# Patient Record
Sex: MALE | Race: WHITE | NOT HISPANIC OR LATINO | Employment: FULL TIME | URBAN - METROPOLITAN AREA
[De-identification: names, ages, dates, MRNs, and addresses within clinical notes are randomized per-mention and may not be internally consistent; named-entity substitution may affect disease eponyms.]

---

## 2019-01-29 ENCOUNTER — OFFICE VISIT (OUTPATIENT)
Dept: RETAIL CLINIC | Facility: CLINIC | Age: 29
End: 2019-01-29

## 2019-01-29 VITALS
OXYGEN SATURATION: 99 % | SYSTOLIC BLOOD PRESSURE: 110 MMHG | DIASTOLIC BLOOD PRESSURE: 64 MMHG | RESPIRATION RATE: 18 BRPM | TEMPERATURE: 98.7 F | HEART RATE: 74 BPM

## 2019-01-29 DIAGNOSIS — H00.011 HORDEOLUM EXTERNUM OF RIGHT UPPER EYELID: Primary | ICD-10-CM

## 2019-01-29 DIAGNOSIS — H01.001 BLEPHARITIS OF RIGHT UPPER EYELID, UNSPECIFIED TYPE: ICD-10-CM

## 2019-01-29 PROCEDURE — 99203 OFFICE O/P NEW LOW 30 MIN: CPT | Performed by: NURSE PRACTITIONER

## 2019-01-29 RX ORDER — NEOMYCIN SULFATE, POLYMYXIN B SULFATE, AND DEXAMETHASONE 3.5; 10000; 1 MG/G; [USP'U]/G; MG/G
OINTMENT OPHTHALMIC EVERY 6 HOURS SCHEDULED
Qty: 3.5 G | Refills: 0 | Status: SHIPPED | OUTPATIENT
Start: 2019-01-29 | End: 2019-02-03

## 2019-01-29 NOTE — PROGRESS NOTES
Subjective   Donta Gibson is a 28 y.o. male.     Eye Problem    The right eye is affected. This is a new problem. Episode onset: 2 days. The problem occurs constantly. The problem has been gradually worsening. There was no injury mechanism. The pain is mild. There is no known exposure to pink eye. Associated symptoms include eye redness (eyelid). Pertinent negatives include no blurred vision. Treatments tried: hot compress. The treatment provided no relief.        The following portions of the patient's history were reviewed and updated as appropriate: allergies, current medications, past family history, past medical history, past social history, past surgical history and problem list.    Review of Systems   HENT: Negative.    Eyes: Positive for redness (eyelid). Negative for blurred vision.   Respiratory: Negative.    Cardiovascular: Negative.    Gastrointestinal: Negative.    Musculoskeletal: Negative.    Neurological: Negative.        Objective   Physical Exam   Constitutional: He is cooperative. No distress.   HENT:   Head: Normocephalic.   Right Ear: Hearing, tympanic membrane, external ear and ear canal normal.   Left Ear: Hearing, tympanic membrane, external ear and ear canal normal.   Nose: Nose normal.   Mouth/Throat: Oropharynx is clear and moist.   Eyes: Conjunctivae, EOM and lids are normal. Pupils are equal, round, and reactive to light.   Right eyelid with moderate amount of swelling not obstructing vision and redness on the lid, tender to touch. No vision disturbance   Neck: Trachea normal and full passive range of motion without pain.   Cardiovascular: Normal rate, regular rhythm and normal pulses.   Pulmonary/Chest: Effort normal and breath sounds normal.   Neurological: He is alert.   Skin: Skin is warm. Capillary refill takes less than 2 seconds.   Psychiatric: He has a normal mood and affect. His speech is normal and behavior is normal.   Vitals reviewed.        Assessment/Plan   Donta was seen  today for stye.    Diagnoses and all orders for this visit:    Hordeolum externum of right upper eyelid    Blepharitis of right upper eyelid, unspecified type    Other orders  -     neomycin-polymyxin-dexamethamethasone (POLYDEX) 3.5-33682-8.1 ointment ophthalmic ointment; Administer  to the right eye Every 6 (Six) Hours for 5 days.      Continue to use warm compress every 4-6 hours and ibuprofen for pain

## 2019-01-29 NOTE — PATIENT INSTRUCTIONS
Blepharitis  Blepharitis is inflammation of the eyelids. Blepharitis may happen with:  · Reddish, scaly skin around the scalp and eyebrows.  · Burning or itching of the eyelids.  · Eye discharge at night that causes the eyelashes to stick together in the morning.  · Eyelashes that fall out.  · Sensitivity to light.    Follow these instructions at home:  Pay attention to any changes in how you look or feel. Follow these instructions to help with your condition:  Keeping Clean  · Wash your hands often.  · Wash your eyelids with warm water or with warm water that is mixed with a small amount of baby shampoo. Do this two times per day or as often as needed.  · Wash your face and eyebrows at least once a day.  · Use a clean towel each time you dry your eyelids. Do not use this towel to clean or dry other areas of your body. Do not share your towel with anyone.  General instructions  · Avoid wearing makeup until you get better. Do not share makeup with anyone.  · Avoid rubbing your eyes.  · Apply warm compresses to your eyes 2 times per day for 10 minutes at a time, or as told by your health care provider.  · If you were prescribed an antibiotic ointment or steroid drops, apply or use the medicine as told by your health care provider. Do not stop using the medicine even if you feel better.  · Keep all follow-up visits as told by your health care provider. This is important.  Contact a health care provider if:  · Your eyelids feel hot.  · You have blisters or a rash on your eyelids.  · The condition does not go away in 2-4 days.  · The inflammation gets worse.  Get help right away if:  · You have pain or redness that gets worse or spreads to other parts of your face.  · Your vision changes.  · You have pain when looking at lights or moving objects.  · You have a fever.  This information is not intended to replace advice given to you by your health care provider. Make sure you discuss any questions you have with your health  care provider.  Document Released: 12/15/2001 Document Revised: 05/25/2017 Document Reviewed: 04/11/2016  [employer] Interactive Patient Education © 2018 [employer]  Stye  A stye is a bump on your eyelid caused by a bacterial infection. A stye can form inside the eyelid (internal stye) or outside the eyelid (external stye). An internal stye may be caused by an infected oil-producing gland inside your eyelid. An external stye may be caused by an infection at the base of your eyelash (hair follicle).  Styes are very common. Anyone can get them at any age. They usually occur in just one eye, but you may have more than one in either eye.  What are the causes?  The infection is almost always caused by bacteria called Staphylococcus aureus. This is a common type of bacteria that lives on your skin.  What increases the risk?  You may be at higher risk for a stye if you have had one before. You may also be at higher risk if you have:  · Diabetes.  · Long-term illness.  · Long-term eye redness.  · A skin condition called seborrhea.  · High fat levels in your blood (lipids).    What are the signs or symptoms?  Eyelid pain is the most common symptom of a stye. Internal styes are more painful than external styes. Other signs and symptoms may include:  · Painful swelling of your eyelid.  · A scratchy feeling in your eye.  · Tearing and redness of your eye.  · Pus draining from the stye.    How is this diagnosed?  Your health care provider may be able to diagnose a stye just by examining your eye. The health care provider may also check to make sure:  · You do not have a fever or other signs of a more serious infection.  · The infection has not spread to other parts of your eye or areas around your eye.    How is this treated?  Most styes will clear up in a few days without treatment. In some cases, you may need to use antibiotic drops or ointment to prevent infection. Your health care provider may have to drain the stye  surgically if your stye is:  · Large.  · Causing a lot of pain.  · Interfering with your vision.    This can be done using a thin blade or a needle.  Follow these instructions at home:  · Take medicines only as directed by your health care provider.  · Apply a clean, warm compress to your eye for 10 minutes, 4 times a day.  · Do not wear contact lenses or eye makeup until your stye has healed.  · Do not try to pop or drain the stye.  Contact a health care provider if:  · You have chills or a fever.  · Your stye does not go away after several days.  · Your stye affects your vision.  · Your eyeball becomes swollen, red, or painful.  This information is not intended to replace advice given to you by your health care provider. Make sure you discuss any questions you have with your health care provider.  Document Released: 09/27/2006 Document Revised: 08/13/2017 Document Reviewed: 04/03/2015  [employer] Interactive Patient Education © 2018 [employer]

## 2020-05-28 ENCOUNTER — TRANSCRIBE ORDERS (OUTPATIENT)
Dept: ADMINISTRATIVE | Facility: HOSPITAL | Age: 30
End: 2020-05-28

## 2020-05-28 DIAGNOSIS — N62 GYNECOMASTIA: Primary | ICD-10-CM

## 2020-05-28 DIAGNOSIS — N64.4 PAIN OF LEFT BREAST: ICD-10-CM

## 2020-06-03 ENCOUNTER — HOSPITAL ENCOUNTER (OUTPATIENT)
Dept: GENERAL RADIOLOGY | Facility: HOSPITAL | Age: 30
Discharge: HOME | End: 2020-06-03

## 2020-06-03 ENCOUNTER — HOSPITAL ENCOUNTER (OUTPATIENT)
Dept: MAMMOGRAPHY | Facility: HOSPITAL | Age: 30
Discharge: HOME | End: 2020-06-03
Admitting: INTERNAL MEDICINE

## 2020-06-03 DIAGNOSIS — M54.50 LOW BACK PAIN, UNSPECIFIED BACK PAIN LATERALITY, UNSPECIFIED CHRONICITY, UNSPECIFIED WHETHER SCIATICA PRESENT: ICD-10-CM

## 2020-06-03 DIAGNOSIS — N64.4 PAIN OF LEFT BREAST: ICD-10-CM

## 2020-06-03 DIAGNOSIS — N62 GYNECOMASTIA: ICD-10-CM

## 2020-06-03 PROCEDURE — 77066 DX MAMMO INCL CAD BI: CPT

## 2020-06-03 PROCEDURE — 72110 X-RAY EXAM L-2 SPINE 4/>VWS: CPT

## 2020-06-19 ENCOUNTER — OFFICE VISIT (OUTPATIENT)
Dept: MAMMOGRAPHY | Facility: CLINIC | Age: 30
End: 2020-06-19

## 2020-06-19 VITALS
DIASTOLIC BLOOD PRESSURE: 70 MMHG | HEIGHT: 70 IN | WEIGHT: 178 LBS | SYSTOLIC BLOOD PRESSURE: 122 MMHG | BODY MASS INDEX: 25.48 KG/M2

## 2020-06-19 DIAGNOSIS — N62 GYNECOMASTIA, MALE: Primary | ICD-10-CM

## 2020-06-19 PROCEDURE — 99204 OFFICE O/P NEW MOD 45 MIN: CPT | Performed by: SURGERY

## 2020-06-19 RX ORDER — BUPROPION HYDROCHLORIDE 150 MG/1
TABLET ORAL
COMMUNITY
Start: 2020-05-07 | End: 2021-01-25

## 2020-06-19 NOTE — PROGRESS NOTES
Chief Complaint: Donta Gibson is a 29 y.o.. female here today for Breast Mass        History of Present Illness:  Patient presents with breast mass.gynecomastia in both breast.  He is a 29-year-old white male who complains of a subareolar left breast mass and some pain.  The pain actually began years ago and is gotten some better but now when the area is palpated or pushed on it gets very tender.  He can feel a rubbery mass beneath the nipple areolar complex that he thinks measures about 2 inches in size.  There is been no nipple discharge.  He did undergo a mammogram which revealed severe gynecomastia on the left with some mild gynecomastia on the right.    The patient does lift weights but does not take steroids.  He does admit to smoking quite a bit of marijuana.    I have personally reviewed the imaging studies and the reports accompanying them.  I agree that the process seems significantly worse on the left side.    Review of Systems:  Review of Systems   Skin:        The patient denies any noticeable changes to the skin of the breast.    All other systems reviewed and are negative.     I have reviewed the ROS as documented by the MA/LPN/RN Flip Rivzi MD      Past Medical and Surgical History:  Breast Biopsy History:  Patient has not had a breast biopsy in the past.  Breast Cancer HIstory:  Patient does not have a past medical history of breast cancer.  Breast Operations, and year:  None  Social History     Tobacco Use   Smoking Status Never Smoker         History reviewed. No pertinent surgical history.    History reviewed. No pertinent past medical history.    Prior Hospitalizations, other than for surgery or childbirth, and year:  None    Social History:  Patient is single.  Patient has no children.    Family History:  History reviewed. No pertinent family history.    Vital Signs:  Vitals:    06/19/20 1358   BP: 122/70       Medications:    Current Outpatient Prescriptions:     Current Outpatient  Medications:   •  buPROPion XL (WELLBUTRIN XL) 150 MG 24 hr tablet, , Disp: , Rfl:     Physical Examination:  General Appearance:   Patient is in no distress.  She is well kept and has an average build.   Psychiatric:  Patient with appropriate mood and affect. Alert and oriented to self, time, and place.    Breast, RIGHT:  small sized, symmetric with the contralateral side.  Breast skin is without erythema, edema, rashes.  He has very well-developed pectoralis muscles.  There are no visible abnormalities upon inspection during the arm-raising maneuver or with hands on hips in the sitting position. There is no nipple retraction, discharge or nipple/areolar skin changes.There are no masses palpable in the sitting or supine positions.    Breast, LEFT:  small sized, symmetric with the contralateral side.  Breast skin is without erythema, edema, rashes.   He has very well-developed pectoralis muscles.  There are no visible abnormalities upon inspection during the arm-raising maneuver or with hands on hips in the sitting position. There is no nipple retraction, discharge or nipple/areolar skin changes.there is a rubbery mass beneath the nipple areolar complex measuring approximately 2.5 x 2 cm.    Lymphatic:  There is no axillary, cervical, infraclavicular, or supraclavicular adenopathy bilaterally.  Eyes:  Pupils are round and reactive to light.  Cardiovascular:  Heart rate and rhythm are regular.  Respiratory:  Lungs are clear bilaterally with no crackles or wheezes in any lung field.  Gastrointestinal:  Abdomen is soft, nondistended, and nontender.  There was no obvious hepatosplenomegaly or abdominal mass.  There are no scars from previous surgery.        Assessment:  1. Gynecomastia, male          Plan:  We discussed the various etiologies of gynecomastia.  I suspect this process has been going on for quite some time.  We will order some blood work to rule out chronic disease and endocrine abnormalities.  If  everything looks normal we will likely proceed with surgery.  I have discussed what is involved with surgery.  The patient understands there could be some flattening to the nipple areolar complex on the left side after surgery and he said that that would not be a problem for him.      CPT coding:    Next Appointment:  No follow-ups on file.            EMR Dragon/transcription disclaimer:    Much of this encounter note is an electronic transcription/translocation of spoken language to printed text.  The electronic translation of spoken language may permit erroneous, or at times, nonsensical words or phrases to be inadvertently transcribed.  Although I have reviewed the note from such areas, some may still exist.  Answers for HPI/ROS submitted by the patient on 6/17/2020   What is the primary reason for your visit?: Other  Please describe your symptoms.: pain + Englarged  Have you had these symptoms before?: Yes  How long have you been having these symptoms?: Greater than 2 weeks

## 2020-06-25 ENCOUNTER — OFFICE VISIT (OUTPATIENT)
Dept: SURGERY | Facility: CLINIC | Age: 30
End: 2020-06-25

## 2020-06-25 VITALS
SYSTOLIC BLOOD PRESSURE: 130 MMHG | OXYGEN SATURATION: 98 % | BODY MASS INDEX: 25.48 KG/M2 | HEART RATE: 80 BPM | WEIGHT: 178 LBS | DIASTOLIC BLOOD PRESSURE: 82 MMHG | TEMPERATURE: 98.1 F | HEIGHT: 70 IN

## 2020-06-25 DIAGNOSIS — K64.8 INTERNAL HEMORRHOIDS WITH COMPLICATION: Primary | ICD-10-CM

## 2020-06-25 PROCEDURE — 46600 DIAGNOSTIC ANOSCOPY SPX: CPT | Performed by: PHYSICIAN ASSISTANT

## 2020-06-25 PROCEDURE — 99242 OFF/OP CONSLTJ NEW/EST SF 20: CPT | Performed by: PHYSICIAN ASSISTANT

## 2020-06-25 RX ORDER — HYDROCORTISONE 25 MG/G
CREAM TOPICAL
Qty: 30 G | Refills: 1 | Status: SHIPPED | OUTPATIENT
Start: 2020-06-25 | End: 2021-01-25

## 2020-06-26 ENCOUNTER — LAB (OUTPATIENT)
Dept: LAB | Facility: HOSPITAL | Age: 30
End: 2020-06-26

## 2020-06-26 DIAGNOSIS — N62 GYNECOMASTIA, MALE: ICD-10-CM

## 2020-06-26 LAB
ALBUMIN SERPL-MCNC: 4.5 G/DL (ref 3.5–5.2)
ALBUMIN/GLOB SERPL: 1.7 G/DL
ALP SERPL-CCNC: 58 U/L (ref 39–117)
ALT SERPL W P-5'-P-CCNC: 30 U/L (ref 1–41)
ANION GAP SERPL CALCULATED.3IONS-SCNC: 9.8 MMOL/L (ref 5–15)
AST SERPL-CCNC: 21 U/L (ref 1–40)
BILIRUB SERPL-MCNC: 0.6 MG/DL (ref 0.2–1.2)
BUN BLD-MCNC: 18 MG/DL (ref 6–20)
BUN/CREAT SERPL: 19.1 (ref 7–25)
CALCIUM SPEC-SCNC: 9.4 MG/DL (ref 8.6–10.5)
CHLORIDE SERPL-SCNC: 97 MMOL/L (ref 98–107)
CO2 SERPL-SCNC: 28.2 MMOL/L (ref 22–29)
CREAT BLD-MCNC: 0.94 MG/DL (ref 0.76–1.27)
FSH SERPL-ACNC: 1.98 MIU/ML
GFR SERPL CREATININE-BSD FRML MDRD: 94 ML/MIN/1.73
GLOBULIN UR ELPH-MCNC: 2.7 GM/DL
GLUCOSE BLD-MCNC: 92 MG/DL (ref 65–99)
HCG SERPL QL: NEGATIVE
LH SERPL-ACNC: 11.9 MIU/ML
POTASSIUM BLD-SCNC: 4 MMOL/L (ref 3.5–5.2)
PROT SERPL-MCNC: 7.2 G/DL (ref 6–8.5)
SODIUM BLD-SCNC: 135 MMOL/L (ref 136–145)
TSH SERPL DL<=0.05 MIU/L-ACNC: 1.32 UIU/ML (ref 0.27–4.2)

## 2020-06-26 PROCEDURE — 84703 CHORIONIC GONADOTROPIN ASSAY: CPT

## 2020-06-26 PROCEDURE — 83001 ASSAY OF GONADOTROPIN (FSH): CPT

## 2020-06-26 PROCEDURE — 84443 ASSAY THYROID STIM HORMONE: CPT

## 2020-06-26 PROCEDURE — 82626 DEHYDROEPIANDROSTERONE: CPT

## 2020-06-26 PROCEDURE — 80053 COMPREHEN METABOLIC PANEL: CPT

## 2020-06-26 PROCEDURE — 84403 ASSAY OF TOTAL TESTOSTERONE: CPT

## 2020-06-26 PROCEDURE — 84402 ASSAY OF FREE TESTOSTERONE: CPT

## 2020-06-26 PROCEDURE — 82670 ASSAY OF TOTAL ESTRADIOL: CPT

## 2020-06-26 PROCEDURE — 36415 COLL VENOUS BLD VENIPUNCTURE: CPT

## 2020-06-26 PROCEDURE — 84999 UNLISTED CHEMISTRY PROCEDURE: CPT

## 2020-06-26 PROCEDURE — 83002 ASSAY OF GONADOTROPIN (LH): CPT

## 2020-06-30 LAB
TESTOST FREE SERPL-MCNC: 17.7 PG/ML (ref 8.7–25.1)
TESTOST SERPL-MCNC: 753.6 NG/DL (ref 264–916)

## 2020-07-01 LAB — DHEA SERPL-MCNC: 324 NG/DL (ref 31–701)

## 2020-07-03 LAB
ESTRADIOL FREE MFR SERPL: 2.1 %
ESTRADIOL FREE SERPL-MCNC: 0.55 PG/ML
ESTRADIOL SERPL HS-MCNC: 26 PG/ML

## 2020-07-17 ENCOUNTER — TELEPHONE (OUTPATIENT)
Dept: MAMMOGRAPHY | Facility: CLINIC | Age: 30
End: 2020-07-17

## 2020-07-17 NOTE — TELEPHONE ENCOUNTER
I told him all the blood work that we aneta was essentially normal.  I think he has physiologic gynecomastia.  He is interested in surgery but is planning to lose some weight and would like to go ahead and do that first to see what things look like.  He will call us when he is ready to schedule surgery.

## 2020-08-14 ENCOUNTER — PROCEDURE VISIT (OUTPATIENT)
Dept: SURGERY | Facility: CLINIC | Age: 30
End: 2020-08-14

## 2020-08-14 VITALS
BODY MASS INDEX: 26.77 KG/M2 | DIASTOLIC BLOOD PRESSURE: 76 MMHG | HEIGHT: 70 IN | SYSTOLIC BLOOD PRESSURE: 140 MMHG | WEIGHT: 187 LBS | HEART RATE: 65 BPM | OXYGEN SATURATION: 97 % | TEMPERATURE: 97.8 F

## 2020-08-14 DIAGNOSIS — K62.5 ANAL BLEEDING: Primary | ICD-10-CM

## 2020-08-14 PROCEDURE — 99212 OFFICE O/P EST SF 10 MIN: CPT | Performed by: COLON & RECTAL SURGERY

## 2020-08-14 NOTE — PROGRESS NOTES
"Donta Gibson is a 30 y.o. male in for follow up of Anal bleeding    No issues now. No rb since seen in late June  bm reg - no fiber - no ss  Was afraid of bleeding because of previous history of fissure and perirectal abscess  Rubber band ligation was discussed when he was here last    /76 (BP Location: Left arm, Patient Position: Sitting, Cuff Size: Adult)   Pulse 65   Temp 97.8 °F (36.6 °C) (Temporal)   Ht 177.8 cm (70\")   Wt 84.8 kg (187 lb)   SpO2 97%   BMI 26.83 kg/m²   Body mass index is 26.83 kg/m².      PE:  Physical Exam   Constitutional: He appears well-developed. No distress.   HENT:   Head: Normocephalic and atraumatic.   Abdominal: Soft. He exhibits no distension.   Musculoskeletal: Normal range of motion.   Neurological: He is alert.   Psychiatric: Thought content normal.         Assessment:   1. Anal bleeding       Resolved  Plan:    There is no reason to do the rubber band ligation since asymptomatic  Discussed colonoscopy and unless he has a recurrence of symptoms I do believe he needs one at this time.  He will do screening at 44  Return PRN  "

## 2020-11-12 ENCOUNTER — TELEPHONE (OUTPATIENT)
Dept: MAMMOGRAPHY | Facility: CLINIC | Age: 30
End: 2020-11-12

## 2020-11-12 NOTE — TELEPHONE ENCOUNTER
Mr. Gibson called to schedule surgery. Due to his leaving the country the 26th of this month I have scheduled him for Jan 27 (his choice) for surgery. He will see you on the 19 of Jan for a consult prior to surgery    Thank you, Mony

## 2021-01-19 ENCOUNTER — OFFICE VISIT (OUTPATIENT)
Dept: MAMMOGRAPHY | Facility: CLINIC | Age: 31
End: 2021-01-19

## 2021-01-19 ENCOUNTER — PREP FOR SURGERY (OUTPATIENT)
Dept: OTHER | Facility: HOSPITAL | Age: 31
End: 2021-01-19

## 2021-01-19 VITALS — SYSTOLIC BLOOD PRESSURE: 110 MMHG | DIASTOLIC BLOOD PRESSURE: 70 MMHG

## 2021-01-19 DIAGNOSIS — N62 GYNECOMASTIA, MALE: Primary | ICD-10-CM

## 2021-01-19 DIAGNOSIS — N62 GYNECOMASTIA: Primary | ICD-10-CM

## 2021-01-19 PROCEDURE — 99213 OFFICE O/P EST LOW 20 MIN: CPT | Performed by: SURGERY

## 2021-01-19 RX ORDER — CEFAZOLIN SODIUM 2 G/100ML
2 INJECTION, SOLUTION INTRAVENOUS ONCE
Status: CANCELLED | OUTPATIENT
Start: 2021-01-27 | End: 2021-01-19

## 2021-01-19 RX ORDER — ACETAMINOPHEN 500 MG
1000 TABLET ORAL ONCE
Status: CANCELLED | OUTPATIENT
Start: 2021-01-27 | End: 2021-01-19

## 2021-01-19 NOTE — PROGRESS NOTES
Subjective   Donta Gibson is a 30 y.o. male     History of Present Illness this is a follow-up visit for 30-year-old male with a left breast mass.  I last saw him in June 2020.  At that time the patient had noted some tenderness and a lump beneath the nipple areolar complex in the left breast.  Imaging studies were obtained and he was noted to have severe gynecomastia on the left and perhaps some mild gynecomastia on the right.  He has not really had any symptoms on the right-hand side.  The patient was anxious to have this area removed but he also was planning on losing some weight and we opted to allow him to lose the weight and reassess the situation.  He is now ready to consider surgery.        Review of Systems constitutional-some weight loss that was on purpose.  Past Medical History:   Diagnosis Date   • Anal fissure 05/2012    [facility]   • Blepharitis of right upper eyelid 01/2019   • Gynecomastia 06/19/2020    LEFT-SEVERE, RIGHT-MILD, SEEN BY DR. DERRICK MINER   • Hemorrhoids    • Hordeolum externum of right upper eyelid 01/2019   • Low back pain 06/2020   • Perirectal abscess 10/2015   • Pilonidal cyst 05/2012     Past Surgical History:   Procedure Laterality Date   • ANAL FISSURECTOMY/FISTULECTOMY N/A 05/15/2012    DR. DARY PAULA AT [facility]   • ANAL FISTULOTOMY N/A 10/20/2015    DR. MICKI HORAN AT [facility]   • INCISION AND DRAINAGE PERIRECTAL ABSCESS N/A 10/20/2015    DR. MICKI HORAN AT [facility]   • PILONIDAL CYSTECTOMY N/A 05/15/2012    DR. DARY PAULA AT [facility]     No family history on file.  Social History     Socioeconomic History   • Marital status: Single     Spouse name: Not on file   • Number of children: Not on file   • Years of education: Not on file   • Highest education level: Not on file   Tobacco Use   • Smoking status: Never Smoker   • Smokeless tobacco: Never Used   Substance and Sexual Activity   • Alcohol use: Yes     Comment: 1-3 daily   • Drug use: Yes     Types: Marijuana   •  Sexual activity: Defer     Comment: single       Objective   Physical Exam  Constitutional-average weight male in no acute distress  Heart-regular rate and rhythm without murmur  Lungs-clear to auscultation  Left breast-the patient has well-developed pectoralis muscles.  I do not see any evidence of skin dimpling or nipple retraction.  He has a 2.5 x 2 cm smooth rubbery mass behind the nipple areolar complex.  There are no other masses in the breast itself.  Right breast-there is no skin dimpling, nipple retraction, or nipple discharge.  I do not feel any masses beneath the nipple areolar complex like I do on the other side.  There are also no other masses within the breast itself.  Lymphatics-there is no cervical adenopathy.  He has mild mobile bilateral axillary adenopathy.  Abdomen-soft and nontender without masses or hepatosplenomegaly.    Assessment/Plan   Gynecomastia mainly involving the left breast.  The area has not really increased in size since I last evaluated him but it is clearly present and tender to him.  I think we ought to excise this breast mass and I have discussed the details of the procedure with him.  He is aware of the small chance of infection, bleeding, or an anesthetic complication.    The encounter diagnosis was Gynecomastia.

## 2021-01-25 ENCOUNTER — APPOINTMENT (OUTPATIENT)
Dept: PREADMISSION TESTING | Facility: HOSPITAL | Age: 31
End: 2021-01-25

## 2021-01-25 VITALS
HEART RATE: 62 BPM | DIASTOLIC BLOOD PRESSURE: 70 MMHG | RESPIRATION RATE: 18 BRPM | HEIGHT: 70 IN | BODY MASS INDEX: 24.41 KG/M2 | SYSTOLIC BLOOD PRESSURE: 116 MMHG | TEMPERATURE: 98.2 F | WEIGHT: 170.5 LBS | OXYGEN SATURATION: 100 %

## 2021-01-25 LAB
ANION GAP SERPL CALCULATED.3IONS-SCNC: 10.2 MMOL/L (ref 5–15)
BUN SERPL-MCNC: 14 MG/DL (ref 6–20)
BUN/CREAT SERPL: 17.1 (ref 7–25)
CALCIUM SPEC-SCNC: 9.2 MG/DL (ref 8.6–10.5)
CHLORIDE SERPL-SCNC: 101 MMOL/L (ref 98–107)
CO2 SERPL-SCNC: 27.8 MMOL/L (ref 22–29)
CREAT SERPL-MCNC: 0.82 MG/DL (ref 0.76–1.27)
DEPRECATED RDW RBC AUTO: 37.3 FL (ref 37–54)
ERYTHROCYTE [DISTWIDTH] IN BLOOD BY AUTOMATED COUNT: 11.8 % (ref 12.3–15.4)
GFR SERPL CREATININE-BSD FRML MDRD: 110 ML/MIN/1.73
GLUCOSE SERPL-MCNC: 79 MG/DL (ref 65–99)
HCT VFR BLD AUTO: 39.5 % (ref 37.5–51)
HGB BLD-MCNC: 13.6 G/DL (ref 13–17.7)
MCH RBC QN AUTO: 30 PG (ref 26.6–33)
MCHC RBC AUTO-ENTMCNC: 34.4 G/DL (ref 31.5–35.7)
MCV RBC AUTO: 87 FL (ref 79–97)
PLATELET # BLD AUTO: 220 10*3/MM3 (ref 140–450)
PMV BLD AUTO: 9.6 FL (ref 6–12)
POTASSIUM SERPL-SCNC: 4.5 MMOL/L (ref 3.5–5.2)
RBC # BLD AUTO: 4.54 10*6/MM3 (ref 4.14–5.8)
SODIUM SERPL-SCNC: 139 MMOL/L (ref 136–145)
WBC # BLD AUTO: 5.87 10*3/MM3 (ref 3.4–10.8)

## 2021-01-25 PROCEDURE — 36415 COLL VENOUS BLD VENIPUNCTURE: CPT

## 2021-01-25 PROCEDURE — 85027 COMPLETE CBC AUTOMATED: CPT

## 2021-01-25 PROCEDURE — U0004 COV-19 TEST NON-CDC HGH THRU: HCPCS

## 2021-01-25 PROCEDURE — 80048 BASIC METABOLIC PNL TOTAL CA: CPT

## 2021-01-25 PROCEDURE — C9803 HOPD COVID-19 SPEC COLLECT: HCPCS

## 2021-01-25 RX ORDER — CHLORHEXIDINE GLUCONATE 500 MG/1
CLOTH TOPICAL
COMMUNITY
End: 2021-01-27 | Stop reason: HOSPADM

## 2021-01-26 LAB — SARS-COV-2 RNA RESP QL NAA+PROBE: NOT DETECTED

## 2021-01-27 ENCOUNTER — HOSPITAL ENCOUNTER (OUTPATIENT)
Facility: HOSPITAL | Age: 31
Setting detail: HOSPITAL OUTPATIENT SURGERY
Discharge: HOME | End: 2021-01-27
Attending: SURGERY | Admitting: SURGERY

## 2021-01-27 ENCOUNTER — ANESTHESIA EVENT (OUTPATIENT)
Dept: PERIOP | Facility: HOSPITAL | Age: 31
End: 2021-01-27

## 2021-01-27 ENCOUNTER — ANESTHESIA (OUTPATIENT)
Dept: PERIOP | Facility: HOSPITAL | Age: 31
End: 2021-01-27

## 2021-01-27 VITALS
OXYGEN SATURATION: 96 % | TEMPERATURE: 98.6 F | DIASTOLIC BLOOD PRESSURE: 79 MMHG | BODY MASS INDEX: 24.11 KG/M2 | RESPIRATION RATE: 18 BRPM | SYSTOLIC BLOOD PRESSURE: 123 MMHG | HEIGHT: 70 IN | WEIGHT: 168.43 LBS | HEART RATE: 56 BPM

## 2021-01-27 DIAGNOSIS — N62 GYNECOMASTIA, MALE: ICD-10-CM

## 2021-01-27 PROCEDURE — 25010000002 ONDANSETRON PER 1 MG: Performed by: ANESTHESIOLOGY

## 2021-01-27 PROCEDURE — 25010000002 FENTANYL CITRATE (PF) 100 MCG/2ML SOLUTION: Performed by: ANESTHESIOLOGY

## 2021-01-27 PROCEDURE — 19120 REMOVAL OF BREAST LESION: CPT | Performed by: SURGERY

## 2021-01-27 PROCEDURE — 25010000002 KETOROLAC TROMETHAMINE PER 15 MG: Performed by: ANESTHESIOLOGY

## 2021-01-27 PROCEDURE — 25010000002 DEXAMETHASONE PER 1 MG: Performed by: ANESTHESIOLOGY

## 2021-01-27 PROCEDURE — 25010000003 CEFAZOLIN IN DEXTROSE 2-4 GM/100ML-% SOLUTION: Performed by: SURGERY

## 2021-01-27 PROCEDURE — 25010000002 PROPOFOL 10 MG/ML EMULSION: Performed by: ANESTHESIOLOGY

## 2021-01-27 PROCEDURE — 88307 TISSUE EXAM BY PATHOLOGIST: CPT | Performed by: SURGERY

## 2021-01-27 RX ORDER — DEXAMETHASONE SODIUM PHOSPHATE 10 MG/ML
INJECTION INTRAMUSCULAR; INTRAVENOUS AS NEEDED
Status: DISCONTINUED | OUTPATIENT
Start: 2021-01-27 | End: 2021-01-27 | Stop reason: SURG

## 2021-01-27 RX ORDER — HYDRALAZINE HYDROCHLORIDE 20 MG/ML
5 INJECTION INTRAMUSCULAR; INTRAVENOUS
Status: DISCONTINUED | OUTPATIENT
Start: 2021-01-27 | End: 2021-01-27 | Stop reason: HOSPADM

## 2021-01-27 RX ORDER — SODIUM CHLORIDE 0.9 % (FLUSH) 0.9 %
3-10 SYRINGE (ML) INJECTION AS NEEDED
Status: DISCONTINUED | OUTPATIENT
Start: 2021-01-27 | End: 2021-01-27 | Stop reason: HOSPADM

## 2021-01-27 RX ORDER — FLUMAZENIL 0.1 MG/ML
0.2 INJECTION INTRAVENOUS AS NEEDED
Status: DISCONTINUED | OUTPATIENT
Start: 2021-01-27 | End: 2021-01-27 | Stop reason: HOSPADM

## 2021-01-27 RX ORDER — DIPHENHYDRAMINE HCL 25 MG
25 CAPSULE ORAL
Status: DISCONTINUED | OUTPATIENT
Start: 2021-01-27 | End: 2021-01-27 | Stop reason: HOSPADM

## 2021-01-27 RX ORDER — ONDANSETRON 2 MG/ML
INJECTION INTRAMUSCULAR; INTRAVENOUS AS NEEDED
Status: DISCONTINUED | OUTPATIENT
Start: 2021-01-27 | End: 2021-01-27 | Stop reason: SURG

## 2021-01-27 RX ORDER — KETOROLAC TROMETHAMINE 30 MG/ML
INJECTION, SOLUTION INTRAMUSCULAR; INTRAVENOUS AS NEEDED
Status: DISCONTINUED | OUTPATIENT
Start: 2021-01-27 | End: 2021-01-27 | Stop reason: SURG

## 2021-01-27 RX ORDER — PROMETHAZINE HYDROCHLORIDE 25 MG/1
25 SUPPOSITORY RECTAL ONCE AS NEEDED
Status: DISCONTINUED | OUTPATIENT
Start: 2021-01-27 | End: 2021-01-27 | Stop reason: HOSPADM

## 2021-01-27 RX ORDER — FENTANYL CITRATE 50 UG/ML
50 INJECTION, SOLUTION INTRAMUSCULAR; INTRAVENOUS
Status: DISCONTINUED | OUTPATIENT
Start: 2021-01-27 | End: 2021-01-27 | Stop reason: HOSPADM

## 2021-01-27 RX ORDER — LIDOCAINE HYDROCHLORIDE 20 MG/ML
INJECTION, SOLUTION INFILTRATION; PERINEURAL AS NEEDED
Status: DISCONTINUED | OUTPATIENT
Start: 2021-01-27 | End: 2021-01-27 | Stop reason: SURG

## 2021-01-27 RX ORDER — PROPOFOL 10 MG/ML
VIAL (ML) INTRAVENOUS AS NEEDED
Status: DISCONTINUED | OUTPATIENT
Start: 2021-01-27 | End: 2021-01-27 | Stop reason: SURG

## 2021-01-27 RX ORDER — MAGNESIUM HYDROXIDE 1200 MG/15ML
LIQUID ORAL AS NEEDED
Status: DISCONTINUED | OUTPATIENT
Start: 2021-01-27 | End: 2021-01-27 | Stop reason: HOSPADM

## 2021-01-27 RX ORDER — ACETAMINOPHEN 500 MG
1000 TABLET ORAL ONCE
Status: COMPLETED | OUTPATIENT
Start: 2021-01-27 | End: 2021-01-27

## 2021-01-27 RX ORDER — EPHEDRINE SULFATE 50 MG/ML
5 INJECTION, SOLUTION INTRAVENOUS ONCE AS NEEDED
Status: DISCONTINUED | OUTPATIENT
Start: 2021-01-27 | End: 2021-01-27 | Stop reason: HOSPADM

## 2021-01-27 RX ORDER — CEFAZOLIN SODIUM 2 G/100ML
2 INJECTION, SOLUTION INTRAVENOUS ONCE
Status: COMPLETED | OUTPATIENT
Start: 2021-01-27 | End: 2021-01-27

## 2021-01-27 RX ORDER — FENTANYL CITRATE 50 UG/ML
INJECTION, SOLUTION INTRAMUSCULAR; INTRAVENOUS AS NEEDED
Status: DISCONTINUED | OUTPATIENT
Start: 2021-01-27 | End: 2021-01-27 | Stop reason: SURG

## 2021-01-27 RX ORDER — SODIUM CHLORIDE, SODIUM LACTATE, POTASSIUM CHLORIDE, CALCIUM CHLORIDE 600; 310; 30; 20 MG/100ML; MG/100ML; MG/100ML; MG/100ML
9 INJECTION, SOLUTION INTRAVENOUS CONTINUOUS
Status: DISCONTINUED | OUTPATIENT
Start: 2021-01-27 | End: 2021-01-27 | Stop reason: HOSPADM

## 2021-01-27 RX ORDER — NALOXONE HCL 0.4 MG/ML
0.2 VIAL (ML) INJECTION AS NEEDED
Status: DISCONTINUED | OUTPATIENT
Start: 2021-01-27 | End: 2021-01-27 | Stop reason: HOSPADM

## 2021-01-27 RX ORDER — HYDROCODONE BITARTRATE AND ACETAMINOPHEN 7.5; 325 MG/1; MG/1
1 TABLET ORAL ONCE AS NEEDED
Status: COMPLETED | OUTPATIENT
Start: 2021-01-27 | End: 2021-01-27

## 2021-01-27 RX ORDER — OXYCODONE AND ACETAMINOPHEN 7.5; 325 MG/1; MG/1
1 TABLET ORAL ONCE AS NEEDED
Status: DISCONTINUED | OUTPATIENT
Start: 2021-01-27 | End: 2021-01-27 | Stop reason: HOSPADM

## 2021-01-27 RX ORDER — DIPHENHYDRAMINE HYDROCHLORIDE 50 MG/ML
12.5 INJECTION INTRAMUSCULAR; INTRAVENOUS
Status: DISCONTINUED | OUTPATIENT
Start: 2021-01-27 | End: 2021-01-27 | Stop reason: HOSPADM

## 2021-01-27 RX ORDER — BUPIVACAINE HYDROCHLORIDE 2.5 MG/ML
INJECTION, SOLUTION INFILTRATION; PERINEURAL AS NEEDED
Status: DISCONTINUED | OUTPATIENT
Start: 2021-01-27 | End: 2021-01-27 | Stop reason: HOSPADM

## 2021-01-27 RX ORDER — LABETALOL HYDROCHLORIDE 5 MG/ML
5 INJECTION, SOLUTION INTRAVENOUS
Status: DISCONTINUED | OUTPATIENT
Start: 2021-01-27 | End: 2021-01-27 | Stop reason: HOSPADM

## 2021-01-27 RX ORDER — ONDANSETRON 2 MG/ML
4 INJECTION INTRAMUSCULAR; INTRAVENOUS ONCE AS NEEDED
Status: DISCONTINUED | OUTPATIENT
Start: 2021-01-27 | End: 2021-01-27 | Stop reason: HOSPADM

## 2021-01-27 RX ORDER — SODIUM CHLORIDE 0.9 % (FLUSH) 0.9 %
3 SYRINGE (ML) INJECTION EVERY 12 HOURS SCHEDULED
Status: DISCONTINUED | OUTPATIENT
Start: 2021-01-27 | End: 2021-01-27 | Stop reason: HOSPADM

## 2021-01-27 RX ORDER — MIDAZOLAM HYDROCHLORIDE 1 MG/ML
2 INJECTION INTRAMUSCULAR; INTRAVENOUS
Status: DISCONTINUED | OUTPATIENT
Start: 2021-01-27 | End: 2021-01-27 | Stop reason: HOSPADM

## 2021-01-27 RX ORDER — HYDROCODONE BITARTRATE AND ACETAMINOPHEN 5; 325 MG/1; MG/1
1-2 TABLET ORAL EVERY 4 HOURS PRN
Qty: 10 TABLET | Refills: 0 | Status: SHIPPED | OUTPATIENT
Start: 2021-01-27 | End: 2022-01-21

## 2021-01-27 RX ORDER — FAMOTIDINE 10 MG/ML
20 INJECTION, SOLUTION INTRAVENOUS ONCE
Status: COMPLETED | OUTPATIENT
Start: 2021-01-27 | End: 2021-01-27

## 2021-01-27 RX ORDER — LIDOCAINE HYDROCHLORIDE 10 MG/ML
0.5 INJECTION, SOLUTION EPIDURAL; INFILTRATION; INTRACAUDAL; PERINEURAL ONCE AS NEEDED
Status: DISCONTINUED | OUTPATIENT
Start: 2021-01-27 | End: 2021-01-27 | Stop reason: HOSPADM

## 2021-01-27 RX ORDER — PROMETHAZINE HYDROCHLORIDE 25 MG/1
25 TABLET ORAL ONCE AS NEEDED
Status: DISCONTINUED | OUTPATIENT
Start: 2021-01-27 | End: 2021-01-27 | Stop reason: HOSPADM

## 2021-01-27 RX ORDER — MIDAZOLAM HYDROCHLORIDE 1 MG/ML
1 INJECTION INTRAMUSCULAR; INTRAVENOUS
Status: DISCONTINUED | OUTPATIENT
Start: 2021-01-27 | End: 2021-01-27 | Stop reason: HOSPADM

## 2021-01-27 RX ORDER — HYDROMORPHONE HYDROCHLORIDE 1 MG/ML
0.5 INJECTION, SOLUTION INTRAMUSCULAR; INTRAVENOUS; SUBCUTANEOUS
Status: DISCONTINUED | OUTPATIENT
Start: 2021-01-27 | End: 2021-01-27 | Stop reason: HOSPADM

## 2021-01-27 RX ADMIN — FAMOTIDINE 20 MG: 10 INJECTION INTRAVENOUS at 12:35

## 2021-01-27 RX ADMIN — CEFAZOLIN SODIUM 2 G: 2 INJECTION, SOLUTION INTRAVENOUS at 14:24

## 2021-01-27 RX ADMIN — DEXAMETHASONE SODIUM PHOSPHATE 8 MG: 10 INJECTION INTRAMUSCULAR; INTRAVENOUS at 14:44

## 2021-01-27 RX ADMIN — FENTANYL CITRATE 50 MCG: 50 INJECTION INTRAMUSCULAR; INTRAVENOUS at 14:49

## 2021-01-27 RX ADMIN — HYDROCODONE BITARTRATE AND ACETAMINOPHEN 1 TABLET: 7.5; 325 TABLET ORAL at 15:45

## 2021-01-27 RX ADMIN — FENTANYL CITRATE 50 MCG: 50 INJECTION, SOLUTION INTRAMUSCULAR; INTRAVENOUS at 15:48

## 2021-01-27 RX ADMIN — FENTANYL CITRATE 50 MCG: 50 INJECTION, SOLUTION INTRAMUSCULAR; INTRAVENOUS at 15:53

## 2021-01-27 RX ADMIN — ONDANSETRON HYDROCHLORIDE 4 MG: 2 SOLUTION INTRAMUSCULAR; INTRAVENOUS at 14:44

## 2021-01-27 RX ADMIN — PROPOFOL 200 MG: 10 INJECTION, EMULSION INTRAVENOUS at 14:32

## 2021-01-27 RX ADMIN — SODIUM CHLORIDE, POTASSIUM CHLORIDE, SODIUM LACTATE AND CALCIUM CHLORIDE 9 ML/HR: 600; 310; 30; 20 INJECTION, SOLUTION INTRAVENOUS at 12:35

## 2021-01-27 RX ADMIN — ACETAMINOPHEN 1000 MG: 500 TABLET ORAL at 12:25

## 2021-01-27 RX ADMIN — FENTANYL CITRATE 50 MCG: 50 INJECTION INTRAMUSCULAR; INTRAVENOUS at 14:45

## 2021-01-27 RX ADMIN — KETOROLAC TROMETHAMINE 30 MG: 30 INJECTION, SOLUTION INTRAMUSCULAR; INTRAVENOUS at 14:44

## 2021-01-27 RX ADMIN — LIDOCAINE HYDROCHLORIDE 80 MG: 20 INJECTION, SOLUTION INFILTRATION; PERINEURAL at 14:32

## 2021-01-27 NOTE — ANESTHESIA PREPROCEDURE EVALUATION
Anesthesia Evaluation     Patient summary reviewed and Nursing notes reviewed   NPO Solid Status: > 8 hours  NPO Liquid Status: > 2 hours           Airway   Mallampati: II  Neck ROM: full  No difficulty expected  Dental - normal exam     Pulmonary     breath sounds clear to auscultation  Cardiovascular     Rhythm: regular        Neuro/Psych  GI/Hepatic/Renal/Endo      Musculoskeletal     Abdominal    Substance History      OB/GYN          Other                        Anesthesia Plan    ASA 2     general     intravenous induction     Anesthetic plan, all risks, benefits, and alternatives have been provided, discussed and informed consent has been obtained with: patient.

## 2021-01-27 NOTE — ANESTHESIA POSTPROCEDURE EVALUATION
Patient: Donta Gibson    Procedure Summary     Date: 01/27/21 Room / Location: [facility] OR 02 / [facility] MAIN OR    Anesthesia Start: 1424 Anesthesia Stop: 1529    Procedure: BREAST BIOPSY (Left Breast) Diagnosis:       Gynecomastia, male      (Gynecomastia, male [N62])    Surgeon: Flip Rizvi MD Provider: Bharath Young MD    Anesthesia Type: general ASA Status: 2          Anesthesia Type: general    Vitals  Vitals Value Taken Time   /87 01/27/21 1545   Temp 37 °C (98.6 °F) 01/27/21 1525   Pulse 56 01/27/21 1600   Resp 16 01/27/21 1545   SpO2 97 % 01/27/21 1601   Vitals shown include unvalidated device data.        Post Anesthesia Care and Evaluation    Patient location during evaluation: PACU  Patient participation: complete - patient participated  Level of consciousness: awake and alert  Pain management: adequate  Airway patency: patent  Anesthetic complications: No anesthetic complications    Cardiovascular status: acceptable  Respiratory status: acceptable  Hydration status: acceptable    Comments: --------------------            01/27/21               1605     --------------------   BP:       130/81     Pulse:      58       Resp:       18       Temp:                SpO2:      95%      --------------------

## 2021-01-29 ENCOUNTER — TELEPHONE (OUTPATIENT)
Dept: MAMMOGRAPHY | Facility: CLINIC | Age: 31
End: 2021-01-29

## 2021-01-29 LAB
LAB AP CASE REPORT: NORMAL
LAB AP DIAGNOSIS COMMENT: NORMAL
PATH REPORT.FINAL DX SPEC: NORMAL
PATH REPORT.GROSS SPEC: NORMAL

## 2021-02-09 ENCOUNTER — OFFICE VISIT (OUTPATIENT)
Dept: MAMMOGRAPHY | Facility: CLINIC | Age: 31
End: 2021-02-09

## 2021-02-09 DIAGNOSIS — N62 GYNECOMASTIA, MALE: Primary | ICD-10-CM

## 2021-02-09 PROCEDURE — 99024 POSTOP FOLLOW-UP VISIT: CPT | Performed by: SURGERY

## 2021-02-09 NOTE — PROGRESS NOTES
Chief Complaint: Donta Gibson is a  30 y.o. female, initially referred by No ref. provider found , who is here today for a postoperative visit.    History of Present Illness:  In the interim,Donta Gibson has had the following procedure and resultant pathology report: He has undergone a left breast excisional biopsy for gynecomastia.  The path report confirmed gynecomastia with no atypia or malignancy.    he has noted no redness, warmth,drainage, swelling at the incision site. Denies fever or chills.      Current Outpatient Medications:   •  HYDROcodone-acetaminophen (NORCO) 5-325 MG per tablet, Take 1-2 tablets by mouth Every 4 (Four) Hours As Needed (Pain)., Disp: 10 tablet, Rfl: 0  Physical examination  Left breast-the incision around the edge of the areola is healing nicely.  He has some faint old bruising and some puffiness at the operative site as expected but no signs of infection.  Assessment:  Gynecomastia left breast-the patient is recovering well from his surgery.  He has a good path report.  I told him to expect some fullness for probably 4 months or so.    Plan:  I will see him on an as-needed basis.          EMR Dragon/transcription disclaimer:    Much of this encounter note is an electronic transcription/translocation of spoken language to printed text.  The electronic translation of spoken language may permit erroneous, or at times, nonsensical words or phrases to be inadvertently transcribed.  Although I have reviewed the note from such areas, some may still exist.

## 2021-03-03 ENCOUNTER — IMMUNIZATION (OUTPATIENT)
Dept: VACCINE CLINIC | Facility: HOSPITAL | Age: 31
End: 2021-03-03

## 2021-03-03 ENCOUNTER — APPOINTMENT (OUTPATIENT)
Dept: VACCINE CLINIC | Facility: HOSPITAL | Age: 31
End: 2021-03-03

## 2021-03-03 PROCEDURE — 91300 HC SARSCOV02 VAC 30MCG/0.3ML IM: CPT | Performed by: INTERNAL MEDICINE

## 2021-03-03 PROCEDURE — 0001A: CPT | Performed by: INTERNAL MEDICINE

## 2021-03-24 ENCOUNTER — IMMUNIZATION (OUTPATIENT)
Dept: VACCINE CLINIC | Facility: HOSPITAL | Age: 31
End: 2021-03-24

## 2021-03-24 PROCEDURE — 91300 HC SARSCOV02 VAC 30MCG/0.3ML IM: CPT | Performed by: INTERNAL MEDICINE

## 2021-03-24 PROCEDURE — 0002A: CPT | Performed by: INTERNAL MEDICINE

## 2022-01-21 ENCOUNTER — OFFICE VISIT (OUTPATIENT)
Dept: SURGERY | Facility: CLINIC | Age: 32
End: 2022-01-21

## 2022-01-21 VITALS
OXYGEN SATURATION: 98 % | BODY MASS INDEX: 27.14 KG/M2 | WEIGHT: 189.6 LBS | DIASTOLIC BLOOD PRESSURE: 90 MMHG | HEART RATE: 75 BPM | HEIGHT: 70 IN | TEMPERATURE: 98.4 F | SYSTOLIC BLOOD PRESSURE: 140 MMHG

## 2022-01-21 DIAGNOSIS — K62.5 ANAL BLEEDING: Primary | ICD-10-CM

## 2022-01-21 PROCEDURE — 99213 OFFICE O/P EST LOW 20 MIN: CPT | Performed by: COLON & RECTAL SURGERY

## 2022-01-21 RX ORDER — SODIUM CHLORIDE, SODIUM LACTATE, POTASSIUM CHLORIDE, CALCIUM CHLORIDE 600; 310; 30; 20 MG/100ML; MG/100ML; MG/100ML; MG/100ML
30 INJECTION, SOLUTION INTRAVENOUS CONTINUOUS
Status: CANCELLED | OUTPATIENT
Start: 2022-01-27

## 2022-01-21 RX ORDER — HYDROCORTISONE 25 MG/G
CREAM TOPICAL
Qty: 30 G | Refills: 1 | Status: SHIPPED | OUTPATIENT
Start: 2022-01-21 | End: 2022-07-22
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
## 2022-01-27 ENCOUNTER — HOSPITAL ENCOUNTER (OUTPATIENT)
Facility: HOSPITAL | Age: 32
Setting detail: HOSPITAL OUTPATIENT SURGERY
Discharge: HOME | End: 2022-01-27
Attending: COLON & RECTAL SURGERY | Admitting: COLON & RECTAL SURGERY

## 2022-01-27 ENCOUNTER — ANESTHESIA (OUTPATIENT)
Dept: GASTROENTEROLOGY | Facility: HOSPITAL | Age: 32
End: 2022-01-27

## 2022-01-27 ENCOUNTER — ANESTHESIA EVENT (OUTPATIENT)
Dept: GASTROENTEROLOGY | Facility: HOSPITAL | Age: 32
End: 2022-01-27

## 2022-01-27 VITALS
BODY MASS INDEX: 26.05 KG/M2 | HEART RATE: 66 BPM | SYSTOLIC BLOOD PRESSURE: 109 MMHG | RESPIRATION RATE: 17 BRPM | WEIGHT: 182 LBS | DIASTOLIC BLOOD PRESSURE: 78 MMHG | HEIGHT: 70 IN | OXYGEN SATURATION: 99 %

## 2022-01-27 DIAGNOSIS — K62.5 ANAL BLEEDING: ICD-10-CM

## 2022-01-27 PROCEDURE — 88305 TISSUE EXAM BY PATHOLOGIST: CPT | Performed by: COLON & RECTAL SURGERY

## 2022-01-27 PROCEDURE — 45380 COLONOSCOPY AND BIOPSY: CPT | Performed by: COLON & RECTAL SURGERY

## 2022-01-27 PROCEDURE — 25010000002 PROPOFOL 10 MG/ML EMULSION: Performed by: NURSE ANESTHETIST, CERTIFIED REGISTERED

## 2022-01-27 RX ORDER — PROPOFOL 10 MG/ML
VIAL (ML) INTRAVENOUS CONTINUOUS PRN
Status: DISCONTINUED | OUTPATIENT
Start: 2022-01-27 | End: 2022-01-27 | Stop reason: SURG

## 2022-01-27 RX ORDER — PROPOFOL 10 MG/ML
VIAL (ML) INTRAVENOUS AS NEEDED
Status: DISCONTINUED | OUTPATIENT
Start: 2022-01-27 | End: 2022-01-27 | Stop reason: SURG

## 2022-01-27 RX ORDER — SODIUM CHLORIDE, SODIUM LACTATE, POTASSIUM CHLORIDE, CALCIUM CHLORIDE 600; 310; 30; 20 MG/100ML; MG/100ML; MG/100ML; MG/100ML
30 INJECTION, SOLUTION INTRAVENOUS CONTINUOUS PRN
Status: DISCONTINUED | OUTPATIENT
Start: 2022-01-27 | End: 2022-01-27 | Stop reason: HOSPADM

## 2022-01-27 RX ORDER — SODIUM CHLORIDE, SODIUM LACTATE, POTASSIUM CHLORIDE, CALCIUM CHLORIDE 600; 310; 30; 20 MG/100ML; MG/100ML; MG/100ML; MG/100ML
30 INJECTION, SOLUTION INTRAVENOUS CONTINUOUS
Status: DISCONTINUED | OUTPATIENT
Start: 2022-01-27 | End: 2022-01-27 | Stop reason: HOSPADM

## 2022-01-27 RX ORDER — LIDOCAINE HYDROCHLORIDE 20 MG/ML
INJECTION, SOLUTION INFILTRATION; PERINEURAL AS NEEDED
Status: DISCONTINUED | OUTPATIENT
Start: 2022-01-27 | End: 2022-01-27 | Stop reason: SURG

## 2022-01-27 RX ADMIN — PROPOFOL 200 MG: 10 INJECTION, EMULSION INTRAVENOUS at 13:33

## 2022-01-27 RX ADMIN — LIDOCAINE HYDROCHLORIDE 60 MG: 20 INJECTION, SOLUTION INFILTRATION; PERINEURAL at 13:33

## 2022-01-27 RX ADMIN — SODIUM CHLORIDE, POTASSIUM CHLORIDE, SODIUM LACTATE AND CALCIUM CHLORIDE 30 ML/HR: 600; 310; 30; 20 INJECTION, SOLUTION INTRAVENOUS at 12:50

## 2022-01-27 RX ADMIN — Medication 250 MCG/KG/MIN: at 13:34

## 2022-01-27 RX ADMIN — SODIUM CHLORIDE, POTASSIUM CHLORIDE, SODIUM LACTATE AND CALCIUM CHLORIDE: 600; 310; 30; 20 INJECTION, SOLUTION INTRAVENOUS at 13:29

## 2022-01-27 NOTE — ANESTHESIA POSTPROCEDURE EVALUATION
Patient: Donta Gibson    Procedure Summary     Date: 01/27/22 Room / Location: [facility] ENDOSCOPY 9 /  [facility]    Anesthesia Start: 1329 Anesthesia Stop: 1356    Procedure: COLONOSCOPY TO CECUM WITH RANDOM COLON BIOPSIES (N/A ) Diagnosis:       Anal bleeding      (Anal bleeding [K62.5])    Surgeons: Lakshmi Barrientos MD Provider: Sasha Terrazas MD    Anesthesia Type: MAC ASA Status: 1          Anesthesia Type: MAC    Vitals  Vitals Value Taken Time   /78 01/27/22 1424   Temp     Pulse 66 01/27/22 1424   Resp 17 01/27/22 1424   SpO2 99 % 01/27/22 1424           Post Anesthesia Care and Evaluation    Patient location during evaluation: bedside  Patient participation: complete - patient participated  Level of consciousness: awake  Pain management: adequate  Airway patency: patent  Anesthetic complications: No anesthetic complications    Cardiovascular status: acceptable  Respiratory status: acceptable  Hydration status: acceptable

## 2022-01-27 NOTE — ANESTHESIA PREPROCEDURE EVALUATION
Anesthesia Evaluation     no history of anesthetic complications:               Airway   Mallampati: I  TM distance: >3 FB  Neck ROM: full  No difficulty expected  Dental - normal exam     Pulmonary - normal exam   Cardiovascular - normal exam        Neuro/Psych  GI/Hepatic/Renal/Endo    (+)  GI bleeding ,     Musculoskeletal     Abdominal  - normal exam    Bowel sounds: normal.   Substance History       Comment: THC   OB/GYN          Other                        Anesthesia Plan    ASA 1     MAC     intravenous induction     Anesthetic plan, all risks, benefits, and alternatives have been provided, discussed and informed consent has been obtained with: patient.        CODE STATUS:

## 2022-01-31 LAB
LAB AP CASE REPORT: NORMAL
PATH REPORT.FINAL DX SPEC: NORMAL
PATH REPORT.GROSS SPEC: NORMAL

## 2022-07-22 ENCOUNTER — OFFICE VISIT (OUTPATIENT)
Dept: SURGERY | Facility: CLINIC | Age: 32
End: 2022-07-22

## 2022-07-22 VITALS
TEMPERATURE: 98.2 F | DIASTOLIC BLOOD PRESSURE: 82 MMHG | HEIGHT: 70 IN | WEIGHT: 191 LBS | OXYGEN SATURATION: 98 % | BODY MASS INDEX: 27.35 KG/M2 | HEART RATE: 84 BPM | SYSTOLIC BLOOD PRESSURE: 112 MMHG

## 2022-07-22 DIAGNOSIS — K64.8 INTERNAL HEMORRHOIDS WITH COMPLICATION: Primary | ICD-10-CM

## 2022-07-22 PROCEDURE — 99214 OFFICE O/P EST MOD 30 MIN: CPT | Performed by: PHYSICIAN ASSISTANT

## 2022-07-22 RX ORDER — NEOMYCIN SULFATE, POLYMYXIN B SULFATE, AND DEXAMETHASONE 3.5; 10000; 1 MG/G; [USP'U]/G; MG/G
OINTMENT OPHTHALMIC
COMMUNITY
Start: 2022-07-05 | End: 2022-08-26

## 2022-07-22 RX ORDER — HYDROCORTISONE 25 MG/G
CREAM TOPICAL
Qty: 30 G | Refills: 1 | Status: SHIPPED | OUTPATIENT
Start: 2022-07-22 | End: 2022-10-10 | Stop reason: HOSPADM

## 2022-07-22 RX ORDER — AMOXICILLIN AND CLAVULANATE POTASSIUM 500; 125 MG/1; MG/1
1 TABLET, FILM COATED ORAL 3 TIMES DAILY
COMMUNITY
Start: 2022-07-05 | End: 2022-07-22

## 2022-07-22 RX ORDER — DEXTROAMPHETAMINE SACCHARATE, AMPHETAMINE ASPARTATE, DEXTROAMPHETAMINE SULFATE AND AMPHETAMINE SULFATE 2.5; 2.5; 2.5; 2.5 MG/1; MG/1; MG/1; MG/1
10 TABLET ORAL DAILY
COMMUNITY
Start: 2022-06-24

## 2022-07-22 NOTE — PROGRESS NOTES
"Donta Gibson is a 32 y.o. male in for follow up of rectal drainage and discomfort.    The patient reports that earlier this month he noted the onset of rectal discomfort, swelling, and drainage.  The drainage was either yellow or clear.  He denies associated bleeding.  He feels like something \"popped\" about 10 days ago.  He denies any associated fever.  His stool consistency is [place] 3-4.  He has a daily bowel movement.  He does admit to sitting on the toilet for about 30 minutes in order to have a bowel movement before work.  He is not using any topical treatments in the affected area at this time.  He reports that he has \"fallen off\" his fiber habit.    /82 (BP Location: Left arm, Patient Position: Sitting, Cuff Size: Small Adult)   Pulse 84   Temp 98.2 °F (36.8 °C)   Ht 177.8 cm (70\")   Wt 86.6 kg (191 lb)   SpO2 98%   BMI 27.41 kg/m²   Body mass index is 27.41 kg/m².  Vitals reviewed    PE:  Physical Exam  Vitals reviewed. Exam conducted with a chaperone present.   Constitutional:       General: He is not in acute distress.     Appearance: Normal appearance.   HENT:      Head: Normocephalic and atraumatic.   Eyes:      Extraocular Movements: Extraocular movements intact.   Cardiovascular:      Rate and Rhythm: Normal rate.   Pulmonary:      Effort: Pulmonary effort is normal.   Genitourinary:     Comments: Perianal exam: A small amount of yellowish mucus discharge is noted.  There is no visible opening in the perianal skin to indicate a fistula.  There is no induration or erythema noted to indicate an abscess.  A prolapsing internal hemorrhoid is noted in the left lateral position.  Musculoskeletal:         General: Normal range of motion.      Cervical back: Normal range of motion.   Skin:     General: Skin is warm and dry.   Neurological:      General: No focal deficit present.      Mental Status: He is alert.   Psychiatric:         Mood and Affect: Mood normal.         Behavior: Behavior normal. "       Assessment:   1. Internal hemorrhoids with complication       Plan:  I advised the patient to restart a fiber supplement to a daily total intake of 30 to 40 g for stool consistency.  I encouraged adequate water intake and MiraLAX as needed to keep stools soft.  I prescribed Anusol cream for hemorrhoids and gave instructions for use, including that cream is not for daily long-term use. Follow up in 4-6 weeks for reevaluation with Dr. Barrientos with the potential to discuss surgical intervention if no improvement with conservative measures. Call with any questions, concerns, or worsening symptoms.

## 2022-08-26 ENCOUNTER — OFFICE VISIT (OUTPATIENT)
Dept: SURGERY | Facility: CLINIC | Age: 32
End: 2022-08-26

## 2022-08-26 VITALS
OXYGEN SATURATION: 99 % | WEIGHT: 189.3 LBS | SYSTOLIC BLOOD PRESSURE: 124 MMHG | DIASTOLIC BLOOD PRESSURE: 72 MMHG | BODY MASS INDEX: 27.1 KG/M2 | HEIGHT: 70 IN | TEMPERATURE: 98.1 F | HEART RATE: 77 BPM

## 2022-08-26 DIAGNOSIS — K64.8 INTERNAL HEMORRHOIDS WITH COMPLICATION: Primary | ICD-10-CM

## 2022-08-26 PROCEDURE — 99214 OFFICE O/P EST MOD 30 MIN: CPT | Performed by: COLON & RECTAL SURGERY

## 2022-08-26 RX ORDER — CEFAZOLIN SODIUM 2 G/100ML
2 INJECTION, SOLUTION INTRAVENOUS ONCE
Status: CANCELLED | OUTPATIENT
Start: 2022-10-10 | End: 2022-08-26

## 2022-08-26 RX ORDER — SODIUM CHLORIDE 0.9 % (FLUSH) 0.9 %
3 SYRINGE (ML) INJECTION EVERY 12 HOURS SCHEDULED
Status: CANCELLED | OUTPATIENT
Start: 2022-10-10

## 2022-08-26 RX ORDER — METRONIDAZOLE 500 MG/100ML
500 INJECTION, SOLUTION INTRAVENOUS ONCE
Status: CANCELLED | OUTPATIENT
Start: 2022-10-10 | End: 2022-08-26

## 2022-08-26 RX ORDER — SODIUM CHLORIDE 0.9 % (FLUSH) 0.9 %
3-10 SYRINGE (ML) INJECTION AS NEEDED
Status: CANCELLED | OUTPATIENT
Start: 2022-10-10

## 2022-10-07 ENCOUNTER — PRE-ADMISSION TESTING (OUTPATIENT)
Dept: PREADMISSION TESTING | Facility: HOSPITAL | Age: 32
End: 2022-10-07

## 2022-10-07 VITALS
DIASTOLIC BLOOD PRESSURE: 87 MMHG | HEART RATE: 86 BPM | SYSTOLIC BLOOD PRESSURE: 146 MMHG | OXYGEN SATURATION: 99 % | BODY MASS INDEX: 26.77 KG/M2 | HEIGHT: 70 IN | WEIGHT: 187 LBS | RESPIRATION RATE: 16 BRPM | TEMPERATURE: 98.9 F

## 2022-10-07 LAB
ANION GAP SERPL CALCULATED.3IONS-SCNC: 6.1 MMOL/L (ref 5–15)
BUN SERPL-MCNC: 15 MG/DL (ref 6–20)
BUN/CREAT SERPL: 16.7 (ref 7–25)
CALCIUM SPEC-SCNC: 9.3 MG/DL (ref 8.6–10.5)
CHLORIDE SERPL-SCNC: 106 MMOL/L (ref 98–107)
CO2 SERPL-SCNC: 29.9 MMOL/L (ref 22–29)
CREAT SERPL-MCNC: 0.9 MG/DL (ref 0.76–1.27)
DEPRECATED RDW RBC AUTO: 37.4 FL (ref 37–54)
EGFRCR SERPLBLD CKD-EPI 2021: 115.7 ML/MIN/1.73
ERYTHROCYTE [DISTWIDTH] IN BLOOD BY AUTOMATED COUNT: 11.9 % (ref 12.3–15.4)
GLUCOSE SERPL-MCNC: 94 MG/DL (ref 65–99)
HCT VFR BLD AUTO: 40.7 % (ref 37.5–51)
HGB BLD-MCNC: 13.5 G/DL (ref 13–17.7)
MCH RBC QN AUTO: 28.7 PG (ref 26.6–33)
MCHC RBC AUTO-ENTMCNC: 33.2 G/DL (ref 31.5–35.7)
MCV RBC AUTO: 86.6 FL (ref 79–97)
PLATELET # BLD AUTO: 239 10*3/MM3 (ref 140–450)
PMV BLD AUTO: 9.2 FL (ref 6–12)
POTASSIUM SERPL-SCNC: 4.9 MMOL/L (ref 3.5–5.2)
RBC # BLD AUTO: 4.7 10*6/MM3 (ref 4.14–5.8)
SODIUM SERPL-SCNC: 142 MMOL/L (ref 136–145)
WBC NRBC COR # BLD: 6.88 10*3/MM3 (ref 3.4–10.8)

## 2022-10-07 PROCEDURE — 85027 COMPLETE CBC AUTOMATED: CPT

## 2022-10-07 PROCEDURE — 36415 COLL VENOUS BLD VENIPUNCTURE: CPT

## 2022-10-07 PROCEDURE — 80048 BASIC METABOLIC PNL TOTAL CA: CPT

## 2022-10-07 NOTE — DISCHARGE INSTRUCTIONS
Take the following medications the morning of surgery: NONE    ARRIVAL TIME: 1100AM      General Instructions:  Do not eat solid food after midnight the night before surgery.  You may drink clear liquids day of surgery but must stop at least one hour before your hospital arrival time.  It is beneficial for you to have a clear drink that contains carbohydrates the day of surgery.  We suggest a 12 to 20 ounce bottle of Gatorade or Powerade for non-diabetic patients or a 12 to 20 ounce bottle of G2 or Powerade Zero for diabetic patients. (Pediatric patients, are not advised to drink a 12 to 20 ounce carbohydrate drink)    Clear liquids are liquids you can see through.  Nothing red in color.     Plain water                               Sports drinks  Sodas                                   Gelatin (Jell-O)  Fruit juices without pulp such as white grape juice and apple juice  Popsicles that contain no fruit or yogurt  Tea or coffee (no cream or milk added)  Gatorade / Powerade  G2 / Powerade Zero    Patients who avoid smoking, chewing tobacco and alcohol for 4 weeks prior to surgery have a reduced risk of post-operative complications.  Quit smoking as many days before surgery as you can.  Do not smoke, use chewing tobacco or drink alcohol the day of surgery.   Bring any papers given to you in the doctor’s office.  Wear clean comfortable clothes.  Do not wear contact lenses, false eyelashes or make-up.  Bring a case for your glasses.   Remove all piercings.  Leave jewelry and any other valuables at home.  The Pre-Admission Testing nurse will instruct you to bring medications if unable to obtain an accurate list in Pre-Admission Testing.      Preventing a Surgical Site Infection:  For 2 to 3 days before surgery, avoid shaving with a razor because the razor can irritate skin and make it easier to develop an infection.    Any areas of open skin can increase the risk of a post-operative wound infection by allowing bacteria  to enter and travel throughout the body.  Notify your surgeon if you have any skin wounds / rashes even if it is not near the expected surgical site.  The area will need assessed to determine if surgery should be delayed until it is healed.  The night prior to surgery shower using a fresh bar of anti-bacterial soap (such as Dial) and clean washcloth.  Sleep in a clean bed with clean clothing.  Do not allow pets to sleep with you.  Shower on the morning of surgery using a fresh bar of anti-bacterial soap (such as Dial) and clean washcloth.  Dry with a clean towel and dress in clean clothing.  Ask your surgeon if you will be receiving antibiotics prior to surgery.  Make sure you, your family, and all healthcare providers clean their hands with soap and water or an alcohol based hand  before caring for you or your wound.    Day of surgery:  Your arrival time is approximately two hours before your scheduled surgery time.  Upon arrival, a Pre-op nurse and Anesthesiologist will review your health history, obtain vital signs, and answer questions you may have.  The only belongings needed at this time will be a list of your home medications and if applicable your C-PAP/BI-PAP machine.  A Pre-op nurse will start an IV and you may receive medication in preparation for surgery, including something to help you relax.     Please be aware that surgery does come with discomfort.  We want to make every effort to control your discomfort so please discuss any uncontrolled symptoms with your nurse.   Your doctor will most likely have prescribed pain medications.      If you are going home after surgery you will receive individualized written care instructions before being discharged.  A responsible adult must drive you to and from the hospital on the day of your surgery and stay with you for 24 hours.  Discharge prescriptions can be filled by the hospital pharmacy during regular pharmacy hours.  If you are having surgery late  in the day/evening your prescription may be e-prescribed to your pharmacy.  Please verify your pharmacy hours or chose a 24 hour pharmacy to avoid not having access to your prescription because your pharmacy has closed for the day.    If you are staying overnight following surgery, you will be transported to your hospital room following the recovery period.  [facility] has all private rooms.    If you have any questions please call Pre-Admission Testing at [phone].  Deductibles and co-payments are collected on the day of service. Please be prepared to pay the required co-pay, deductible or deposit on the day of service as defined by your plan.    Call your surgeon immediately if you experience any of the following symptoms:  Sore Throat  Shortness of Breath or difficulty breathing  Cough  Chills  Body soreness or muscle pain  Headache  Fever  New loss of taste or smell  Do not arrive for your surgery ill.  Your procedure will need to be rescheduled to another time.  You will need to call your physician before the day of surgery to avoid any unnecessary exposure to hospital staff as well as other patients.

## 2022-10-10 ENCOUNTER — ANESTHESIA EVENT (OUTPATIENT)
Dept: PERIOP | Facility: HOSPITAL | Age: 32
End: 2022-10-10

## 2022-10-10 ENCOUNTER — HOSPITAL ENCOUNTER (OUTPATIENT)
Facility: HOSPITAL | Age: 32
Setting detail: HOSPITAL OUTPATIENT SURGERY
Discharge: HOME | End: 2022-10-10
Attending: COLON & RECTAL SURGERY | Admitting: COLON & RECTAL SURGERY

## 2022-10-10 ENCOUNTER — ANESTHESIA (OUTPATIENT)
Dept: PERIOP | Facility: HOSPITAL | Age: 32
End: 2022-10-10

## 2022-10-10 VITALS
RESPIRATION RATE: 18 BRPM | SYSTOLIC BLOOD PRESSURE: 142 MMHG | TEMPERATURE: 97.8 F | HEART RATE: 77 BPM | DIASTOLIC BLOOD PRESSURE: 87 MMHG | OXYGEN SATURATION: 98 %

## 2022-10-10 DIAGNOSIS — K64.8 INTERNAL HEMORRHOIDS WITH COMPLICATION: ICD-10-CM

## 2022-10-10 PROCEDURE — 46260 REMOVE IN/EX HEM GROUPS 2+: CPT | Performed by: COLON & RECTAL SURGERY

## 2022-10-10 PROCEDURE — 25010000002 HYDROMORPHONE 1 MG/ML SOLUTION: Performed by: NURSE ANESTHETIST, CERTIFIED REGISTERED

## 2022-10-10 PROCEDURE — 25010000002 MIDAZOLAM PER 1 MG: Performed by: ANESTHESIOLOGY

## 2022-10-10 PROCEDURE — 25010000002 CEFAZOLIN IN DEXTROSE 2-4 GM/100ML-% SOLUTION: Performed by: COLON & RECTAL SURGERY

## 2022-10-10 PROCEDURE — 25010000002 FENTANYL CITRATE (PF) 50 MCG/ML SOLUTION: Performed by: NURSE ANESTHETIST, CERTIFIED REGISTERED

## 2022-10-10 PROCEDURE — 25010000002 PROPOFOL 200 MG/20ML EMULSION: Performed by: NURSE ANESTHETIST, CERTIFIED REGISTERED

## 2022-10-10 PROCEDURE — 25010000002 DEXAMETHASONE SODIUM PHOSPHATE 20 MG/5ML SOLUTION: Performed by: NURSE ANESTHETIST, CERTIFIED REGISTERED

## 2022-10-10 PROCEDURE — 25010000002 ONDANSETRON PER 1 MG: Performed by: NURSE ANESTHETIST, CERTIFIED REGISTERED

## 2022-10-10 PROCEDURE — 88304 TISSUE EXAM BY PATHOLOGIST: CPT | Performed by: COLON & RECTAL SURGERY

## 2022-10-10 PROCEDURE — 25010000002 KETOROLAC TROMETHAMINE PER 15 MG: Performed by: NURSE ANESTHETIST, CERTIFIED REGISTERED

## 2022-10-10 PROCEDURE — 25010000002 FENTANYL CITRATE (PF) 100 MCG/2ML SOLUTION: Performed by: NURSE ANESTHETIST, CERTIFIED REGISTERED

## 2022-10-10 RX ORDER — SODIUM CHLORIDE 0.9 % (FLUSH) 0.9 %
3-10 SYRINGE (ML) INJECTION AS NEEDED
Status: DISCONTINUED | OUTPATIENT
Start: 2022-10-10 | End: 2022-10-10 | Stop reason: HOSPADM

## 2022-10-10 RX ORDER — DIPHENHYDRAMINE HYDROCHLORIDE 50 MG/ML
12.5 INJECTION INTRAMUSCULAR; INTRAVENOUS
Status: DISCONTINUED | OUTPATIENT
Start: 2022-10-10 | End: 2022-10-10 | Stop reason: HOSPADM

## 2022-10-10 RX ORDER — ACETAMINOPHEN 500 MG
500 TABLET ORAL EVERY 6 HOURS PRN
COMMUNITY

## 2022-10-10 RX ORDER — CEFAZOLIN SODIUM 2 G/100ML
2 INJECTION, SOLUTION INTRAVENOUS ONCE
Status: COMPLETED | OUTPATIENT
Start: 2022-10-10 | End: 2022-10-10

## 2022-10-10 RX ORDER — PROMETHAZINE HYDROCHLORIDE 25 MG/1
25 TABLET ORAL ONCE AS NEEDED
Status: DISCONTINUED | OUTPATIENT
Start: 2022-10-10 | End: 2022-10-10 | Stop reason: HOSPADM

## 2022-10-10 RX ORDER — METRONIDAZOLE 500 MG/100ML
500 INJECTION, SOLUTION INTRAVENOUS ONCE
Status: COMPLETED | OUTPATIENT
Start: 2022-10-10 | End: 2022-10-10

## 2022-10-10 RX ORDER — KETOROLAC TROMETHAMINE 30 MG/ML
INJECTION, SOLUTION INTRAMUSCULAR; INTRAVENOUS AS NEEDED
Status: DISCONTINUED | OUTPATIENT
Start: 2022-10-10 | End: 2022-10-10 | Stop reason: SURG

## 2022-10-10 RX ORDER — PROPOFOL 10 MG/ML
INJECTION, EMULSION INTRAVENOUS AS NEEDED
Status: DISCONTINUED | OUTPATIENT
Start: 2022-10-10 | End: 2022-10-10 | Stop reason: SURG

## 2022-10-10 RX ORDER — IBUPROFEN 600 MG/1
600 TABLET ORAL ONCE AS NEEDED
Status: DISCONTINUED | OUTPATIENT
Start: 2022-10-10 | End: 2022-10-10 | Stop reason: HOSPADM

## 2022-10-10 RX ORDER — ONDANSETRON 2 MG/ML
4 INJECTION INTRAMUSCULAR; INTRAVENOUS ONCE AS NEEDED
Status: DISCONTINUED | OUTPATIENT
Start: 2022-10-10 | End: 2022-10-10 | Stop reason: HOSPADM

## 2022-10-10 RX ORDER — EPHEDRINE SULFATE 50 MG/ML
5 INJECTION, SOLUTION INTRAVENOUS ONCE AS NEEDED
Status: DISCONTINUED | OUTPATIENT
Start: 2022-10-10 | End: 2022-10-10 | Stop reason: HOSPADM

## 2022-10-10 RX ORDER — FAMOTIDINE 10 MG/ML
20 INJECTION, SOLUTION INTRAVENOUS ONCE
Status: COMPLETED | OUTPATIENT
Start: 2022-10-10 | End: 2022-10-10

## 2022-10-10 RX ORDER — NALOXONE HCL 0.4 MG/ML
0.2 VIAL (ML) INJECTION AS NEEDED
Status: DISCONTINUED | OUTPATIENT
Start: 2022-10-10 | End: 2022-10-10 | Stop reason: HOSPADM

## 2022-10-10 RX ORDER — ROCURONIUM BROMIDE 10 MG/ML
INJECTION, SOLUTION INTRAVENOUS AS NEEDED
Status: DISCONTINUED | OUTPATIENT
Start: 2022-10-10 | End: 2022-10-10 | Stop reason: SURG

## 2022-10-10 RX ORDER — FENTANYL CITRATE 50 UG/ML
INJECTION, SOLUTION INTRAMUSCULAR; INTRAVENOUS AS NEEDED
Status: DISCONTINUED | OUTPATIENT
Start: 2022-10-10 | End: 2022-10-10 | Stop reason: SURG

## 2022-10-10 RX ORDER — PROMETHAZINE HYDROCHLORIDE 25 MG/1
25 SUPPOSITORY RECTAL ONCE AS NEEDED
Status: DISCONTINUED | OUTPATIENT
Start: 2022-10-10 | End: 2022-10-10 | Stop reason: HOSPADM

## 2022-10-10 RX ORDER — HYDRALAZINE HYDROCHLORIDE 20 MG/ML
5 INJECTION INTRAMUSCULAR; INTRAVENOUS
Status: DISCONTINUED | OUTPATIENT
Start: 2022-10-10 | End: 2022-10-10 | Stop reason: HOSPADM

## 2022-10-10 RX ORDER — OXYCODONE HYDROCHLORIDE AND ACETAMINOPHEN 5; 325 MG/1; MG/1
1-2 TABLET ORAL EVERY 6 HOURS PRN
Qty: 36 TABLET | Refills: 0 | Status: SHIPPED | OUTPATIENT
Start: 2022-10-10 | End: 2022-10-19

## 2022-10-10 RX ORDER — LABETALOL HYDROCHLORIDE 5 MG/ML
5 INJECTION, SOLUTION INTRAVENOUS
Status: DISCONTINUED | OUTPATIENT
Start: 2022-10-10 | End: 2022-10-10 | Stop reason: HOSPADM

## 2022-10-10 RX ORDER — SODIUM CHLORIDE, SODIUM LACTATE, POTASSIUM CHLORIDE, CALCIUM CHLORIDE 600; 310; 30; 20 MG/100ML; MG/100ML; MG/100ML; MG/100ML
9 INJECTION, SOLUTION INTRAVENOUS CONTINUOUS
Status: DISCONTINUED | OUTPATIENT
Start: 2022-10-10 | End: 2022-10-10 | Stop reason: HOSPADM

## 2022-10-10 RX ORDER — HYDROMORPHONE HYDROCHLORIDE 1 MG/ML
0.5 INJECTION, SOLUTION INTRAMUSCULAR; INTRAVENOUS; SUBCUTANEOUS
Status: DISCONTINUED | OUTPATIENT
Start: 2022-10-10 | End: 2022-10-10 | Stop reason: HOSPADM

## 2022-10-10 RX ORDER — MIDAZOLAM HYDROCHLORIDE 1 MG/ML
1 INJECTION INTRAMUSCULAR; INTRAVENOUS
Status: COMPLETED | OUTPATIENT
Start: 2022-10-10 | End: 2022-10-10

## 2022-10-10 RX ORDER — FLUMAZENIL 0.1 MG/ML
0.2 INJECTION INTRAVENOUS AS NEEDED
Status: DISCONTINUED | OUTPATIENT
Start: 2022-10-10 | End: 2022-10-10 | Stop reason: HOSPADM

## 2022-10-10 RX ORDER — LIDOCAINE HYDROCHLORIDE 20 MG/ML
INJECTION, SOLUTION EPIDURAL; INFILTRATION; INTRACAUDAL; PERINEURAL AS NEEDED
Status: DISCONTINUED | OUTPATIENT
Start: 2022-10-10 | End: 2022-10-10 | Stop reason: SURG

## 2022-10-10 RX ORDER — DEXAMETHASONE SODIUM PHOSPHATE 4 MG/ML
INJECTION, SOLUTION INTRA-ARTICULAR; INTRALESIONAL; INTRAMUSCULAR; INTRAVENOUS; SOFT TISSUE AS NEEDED
Status: DISCONTINUED | OUTPATIENT
Start: 2022-10-10 | End: 2022-10-10 | Stop reason: SURG

## 2022-10-10 RX ORDER — OXYCODONE AND ACETAMINOPHEN 7.5; 325 MG/1; MG/1
1 TABLET ORAL EVERY 4 HOURS PRN
Status: DISCONTINUED | OUTPATIENT
Start: 2022-10-10 | End: 2022-10-10 | Stop reason: HOSPADM

## 2022-10-10 RX ORDER — SODIUM CHLORIDE 0.9 % (FLUSH) 0.9 %
3 SYRINGE (ML) INJECTION EVERY 12 HOURS SCHEDULED
Status: DISCONTINUED | OUTPATIENT
Start: 2022-10-10 | End: 2022-10-10 | Stop reason: HOSPADM

## 2022-10-10 RX ORDER — DIPHENHYDRAMINE HCL 25 MG
25 CAPSULE ORAL
Status: DISCONTINUED | OUTPATIENT
Start: 2022-10-10 | End: 2022-10-10 | Stop reason: HOSPADM

## 2022-10-10 RX ORDER — ONDANSETRON 4 MG/1
4 TABLET, FILM COATED ORAL ONCE AS NEEDED
Status: DISCONTINUED | OUTPATIENT
Start: 2022-10-10 | End: 2022-10-10 | Stop reason: HOSPADM

## 2022-10-10 RX ORDER — FENTANYL CITRATE 50 UG/ML
50 INJECTION, SOLUTION INTRAMUSCULAR; INTRAVENOUS
Status: DISCONTINUED | OUTPATIENT
Start: 2022-10-10 | End: 2022-10-10 | Stop reason: HOSPADM

## 2022-10-10 RX ORDER — ACETAMINOPHEN 650 MG
TABLET, EXTENDED RELEASE ORAL AS NEEDED
Status: DISCONTINUED | OUTPATIENT
Start: 2022-10-10 | End: 2022-10-10 | Stop reason: HOSPADM

## 2022-10-10 RX ORDER — POLYETHYLENE GLYCOL 3350 17 G/17G
17 POWDER, FOR SOLUTION ORAL 2 TIMES DAILY
Start: 2022-10-10

## 2022-10-10 RX ORDER — LIDOCAINE HYDROCHLORIDE 40 MG/ML
SOLUTION TOPICAL
Qty: 50 ML | Refills: 4 | Status: SHIPPED | OUTPATIENT
Start: 2022-10-10 | End: 2022-11-09

## 2022-10-10 RX ORDER — HYDROCODONE BITARTRATE AND ACETAMINOPHEN 7.5; 325 MG/1; MG/1
1 TABLET ORAL ONCE AS NEEDED
Status: COMPLETED | OUTPATIENT
Start: 2022-10-10 | End: 2022-10-10

## 2022-10-10 RX ORDER — ONDANSETRON 2 MG/ML
INJECTION INTRAMUSCULAR; INTRAVENOUS AS NEEDED
Status: DISCONTINUED | OUTPATIENT
Start: 2022-10-10 | End: 2022-10-10 | Stop reason: SURG

## 2022-10-10 RX ORDER — MAGNESIUM HYDROXIDE 1200 MG/15ML
LIQUID ORAL AS NEEDED
Status: DISCONTINUED | OUTPATIENT
Start: 2022-10-10 | End: 2022-10-10 | Stop reason: HOSPADM

## 2022-10-10 RX ORDER — IBUPROFEN 600 MG/1
600 TABLET ORAL EVERY 6 HOURS PRN
Qty: 56 TABLET | Refills: 0 | Status: SHIPPED | OUTPATIENT
Start: 2022-10-10

## 2022-10-10 RX ORDER — SUCCINYLCHOLINE/SOD CL,ISO/PF 200MG/10ML
SYRINGE (ML) INTRAVENOUS AS NEEDED
Status: DISCONTINUED | OUTPATIENT
Start: 2022-10-10 | End: 2022-10-10 | Stop reason: SURG

## 2022-10-10 RX ORDER — LIDOCAINE HYDROCHLORIDE 10 MG/ML
0.5 INJECTION, SOLUTION EPIDURAL; INFILTRATION; INTRACAUDAL; PERINEURAL ONCE AS NEEDED
Status: DISCONTINUED | OUTPATIENT
Start: 2022-10-10 | End: 2022-10-10 | Stop reason: HOSPADM

## 2022-10-10 RX ADMIN — FAMOTIDINE 20 MG: 10 INJECTION INTRAVENOUS at 12:17

## 2022-10-10 RX ADMIN — MIDAZOLAM 1 MG: 1 INJECTION, SOLUTION INTRAMUSCULAR; INTRAVENOUS at 12:46

## 2022-10-10 RX ADMIN — SODIUM CHLORIDE, POTASSIUM CHLORIDE, SODIUM LACTATE AND CALCIUM CHLORIDE 9 ML/HR: 600; 310; 30; 20 INJECTION, SOLUTION INTRAVENOUS at 12:16

## 2022-10-10 RX ADMIN — Medication 100 MG: at 13:21

## 2022-10-10 RX ADMIN — SODIUM CHLORIDE, POTASSIUM CHLORIDE, SODIUM LACTATE AND CALCIUM CHLORIDE: 600; 310; 30; 20 INJECTION, SOLUTION INTRAVENOUS at 13:58

## 2022-10-10 RX ADMIN — FENTANYL CITRATE 50 MCG: 50 INJECTION, SOLUTION INTRAMUSCULAR; INTRAVENOUS at 13:37

## 2022-10-10 RX ADMIN — ONDANSETRON 4 MG: 2 INJECTION INTRAMUSCULAR; INTRAVENOUS at 13:48

## 2022-10-10 RX ADMIN — DEXAMETHASONE SODIUM PHOSPHATE 8 MG: 4 INJECTION, SOLUTION INTRAMUSCULAR; INTRAVENOUS at 13:31

## 2022-10-10 RX ADMIN — METRONIDAZOLE 500 MG: 500 INJECTION, SOLUTION INTRAVENOUS at 13:12

## 2022-10-10 RX ADMIN — PROPOFOL 200 MG: 10 INJECTION, EMULSION INTRAVENOUS at 13:21

## 2022-10-10 RX ADMIN — ROCURONIUM BROMIDE 5 MG: 10 INJECTION, SOLUTION INTRAVENOUS at 13:21

## 2022-10-10 RX ADMIN — MIDAZOLAM 1 MG: 1 INJECTION, SOLUTION INTRAMUSCULAR; INTRAVENOUS at 12:57

## 2022-10-10 RX ADMIN — FENTANYL CITRATE 50 MCG: 50 INJECTION INTRAMUSCULAR; INTRAVENOUS at 14:39

## 2022-10-10 RX ADMIN — HYDROMORPHONE HYDROCHLORIDE 1 MG: 1 INJECTION, SOLUTION INTRAMUSCULAR; INTRAVENOUS; SUBCUTANEOUS at 13:18

## 2022-10-10 RX ADMIN — KETOROLAC TROMETHAMINE 30 MG: 30 INJECTION, SOLUTION INTRAMUSCULAR at 13:48

## 2022-10-10 RX ADMIN — Medication 160 MG: at 13:22

## 2022-10-10 RX ADMIN — CEFAZOLIN SODIUM 2 G: 2 INJECTION, SOLUTION INTRAVENOUS at 13:12

## 2022-10-10 RX ADMIN — FENTANYL CITRATE 50 MCG: 50 INJECTION, SOLUTION INTRAMUSCULAR; INTRAVENOUS at 13:58

## 2022-10-10 RX ADMIN — HYDROCODONE BITARTRATE AND ACETAMINOPHEN 1 TABLET: 7.5; 325 TABLET ORAL at 14:40

## 2022-10-10 NOTE — ANESTHESIA PREPROCEDURE EVALUATION
Anesthesia Evaluation     Patient summary reviewed and Nursing notes reviewed   history of anesthetic complications (awareness under anesthesia from septoplasty surgery):  NPO Solid Status: > 8 hours  NPO Liquid Status: > 2 hours           Airway   Mallampati: II  TM distance: >3 FB  Neck ROM: full  No difficulty expected  Dental - normal exam     Pulmonary - normal exam   (+) a smoker Former,   Cardiovascular - normal exam        Neuro/Psych  GI/Hepatic/Renal/Endo      Musculoskeletal     Abdominal    Substance History   (+) drug use (MJ)     OB/GYN          Other                      Anesthesia Plan    ASA 2     general     (I have reviewed the patient's history and chart with the patient, including all pertinent laboratory results and imaging. I have explained the risks of anesthesia including but not limited to dental damage, corneal abrasion, nerve injury, MI, stroke, aspiration, and death. Patient has agreed to proceed.       )  intravenous induction     Anesthetic plan, risks, benefits, and alternatives have been provided, discussed and informed consent has been obtained with: patient.        CODE STATUS:

## 2022-10-10 NOTE — OP NOTE
DATE OF PROCEDURE: 10/10/22     PREOPERATIVE DIAGNOSES: Internal hemorrhoids.      POSTOPERATIVE DIAGNOSES: Internal hemorrhoids.      PROCEDURES PERFORMED: Internal hemorrhoidectomy x 3      SURGEON: Lakshmi Barrientos MD     Surgical Assistant: Brenda Kan PA-C     Estimated Blood Loss: minimal    Specimens:   Order Name Source Comment Collection Info Order Time   TISSUE PATHOLOGY EXAM Anus  Collected By: Lakshmi Barrientos MD 10/10/2022  1:39 PM     Release to patient   Routine Release             Specimens     ID Source Type Tests Collected By Collected At Frozen?    A Anus Tissue · TISSUE PATHOLOGY EXAM   Lakshmi Barrientos MD 10/10/22 1339 No    Description: left lateral hemorrhoid    B Anus Tissue · TISSUE PATHOLOGY EXAM   Lakshmi Barrientos MD 10/10/22 1345 No    Description: right anterior hemorrhoid    C Anus Tissue · TISSUE PATHOLOGY EXAM   Lakshmi Barrientos MD 10/10/22 1346 No    Description: right posterior hemorrhoid           INDICATIONS: This is a 32 y.o. male  who comes in with enlarged hemorrhoids. he has failed conservative therapy and wishes to have them removed. he understands the risks, benefits, and alternatives, and wishes to proceed.      DESCRIPTION OF PROCEDURE: The patient was brought into the operating room, SCDs were placed, antibiotics were infused. After adequate general anesthesia was achieved, the patient was placed prone on the operating room table. Buttocks were effaced with tape, and he was prepped and draped in sterile fashion. Then 1% lidocaine with epinephrine and 0.25% Marcaine without was used as local infiltration and a perineum block. I did a circumferential exam of the anal canal using a lighted [place] and found enlarged internal hemorrhoids.     I did the left lateral, right posterior, and then the right anterior. I did them in the same fashion. I made an elliptical incision around the internal  hemorrhoid. Sweeping the sphincter muscle out of the way, I then used the  cautery to take the hemorrhoid at its base. I used a 2-0 Vicryl in a running fashion to closed the incision.  I then did the others in the same fashion.  I ensured good hemostasis.      All instrument, lap counts, and needle counts were correct. The patient was stable throughout the entire case and was taken to recovery.

## 2022-10-10 NOTE — ANESTHESIA PROCEDURE NOTES
Airway  Urgency: elective    Airway not difficult    General Information and Staff    Patient location during procedure: OR  Anesthesiologist: Latia Gomez MD  CRNA/CAA: Levi Wood CRNA    Indications and Patient Condition  Indications for airway management: airway protection    Preoxygenated: yes  MILS maintained throughout  Mask difficulty assessment: 1 - vent by mask    Final Airway Details  Final airway type: endotracheal airway      Successful airway: ETT  Cuffed: yes   Successful intubation technique: direct laryngoscopy  Facilitating devices/methods: intubating stylet  Endotracheal tube insertion site: oral  Blade: Nair  Blade size: 2  ETT size (mm): 7.5  Cormack-Lehane Classification: grade I - full view of glottis  Placement verified by: chest auscultation and capnometry   Cuff volume (mL): 8  Measured from: lips  ETT/EBT  to lips (cm): 22  Number of attempts at approach: 1  Assessment: lips, teeth, and gum same as pre-op and atraumatic intubation
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
## 2022-10-11 LAB
LAB AP CASE REPORT: NORMAL
PATH REPORT.FINAL DX SPEC: NORMAL
PATH REPORT.GROSS SPEC: NORMAL

## 2022-10-14 NOTE — ANESTHESIA POSTPROCEDURE EVALUATION
Patient: Donta Gibson    Procedure Summary     Date: 10/10/22 Room / Location: [facility] OR 09 / [facility] MAIN OR    Anesthesia Start: 1317 Anesthesia Stop: 1408    Procedure: HEMORRHOIDECTOMY (Anus) Diagnosis:       Internal hemorrhoids with complication      (Internal hemorrhoids with complication [K64.8])    Surgeons: Lakshmi Barrientos MD Provider: Latia Gomez MD    Anesthesia Type: general ASA Status: 2          Anesthesia Type: general    Vitals  Vitals Value Taken Time   /83 10/10/22 1455   Temp 36.6 °C (97.8 °F) 10/10/22 1420   Pulse 76 10/10/22 1500   Resp 16 10/10/22 1408   SpO2 97 % 10/10/22 1500           Post Anesthesia Care and Evaluation      Comments: Post op note entered for chart completion purposes only

## 2022-10-19 ENCOUNTER — TELEPHONE (OUTPATIENT)
Dept: SURGERY | Facility: CLINIC | Age: 32
End: 2022-10-19

## 2022-10-19 DIAGNOSIS — K64.8 INTERNAL HEMORRHOIDS WITH COMPLICATION: Primary | ICD-10-CM

## 2022-10-19 RX ORDER — LIDOCAINE HYDROCHLORIDE 40 MG/ML
SOLUTION TOPICAL
Qty: 50 ML | Refills: 5 | Status: SHIPPED | OUTPATIENT
Start: 2022-10-19 | End: 2022-11-18

## 2022-10-19 RX ORDER — OXYCODONE HYDROCHLORIDE AND ACETAMINOPHEN 5; 325 MG/1; MG/1
TABLET ORAL
Qty: 24 TABLET | Refills: 0 | Status: SHIPPED | OUTPATIENT
Start: 2022-10-19

## 2022-10-19 NOTE — TELEPHONE ENCOUNTER
Pt called because he did not understand [employer] message he received yesterday. Pt is requesting refill on Percocet. Said he started off taking 1 pill every 3-4 hours because he didn't want to take 2 pills at a time. He has since reduced this to taking it BID. Still doing sitz baths, using Lidocaine cream, but feels like he needs a few more percocet to help deal with the pain. Pt had asked in previous [employer] message to get another Rx of Lidocaine cream and said he was told there was 4 refills. Pt says he got the Lidocaine from [facility] pharmacy and is requesting both meds be sent to [facility] on [address] and [address]. Says it is too difficult to get to [facility] in time to get RX.

## 2022-10-19 NOTE — TELEPHONE ENCOUNTER
Pain level #10 this morning, doing sitz bath as we speak and has gone down to a #4 out of 10.    Sitz baths twice a day, using Lidocaine & taking Miralax.    Is requesting pain med but I suggested he needs to alternate with OTC medication. Told him I would ask you which one to purchase.    Which would you recommend if he is taking percocet?    NKDA.    Cell #[phone].    If does not answer per pt ok 'd to leave voice message or send reply via [employer].    Thank you.

## 2022-10-25 ENCOUNTER — OFFICE VISIT (OUTPATIENT)
Dept: SURGERY | Facility: CLINIC | Age: 32
End: 2022-10-25

## 2022-10-25 VITALS
WEIGHT: 186.5 LBS | HEART RATE: 70 BPM | SYSTOLIC BLOOD PRESSURE: 122 MMHG | OXYGEN SATURATION: 98 % | HEIGHT: 70 IN | BODY MASS INDEX: 26.7 KG/M2 | TEMPERATURE: 98.1 F | DIASTOLIC BLOOD PRESSURE: 72 MMHG

## 2022-10-25 DIAGNOSIS — K64.8 INTERNAL HEMORRHOIDS WITH COMPLICATION: Primary | ICD-10-CM

## 2022-10-25 PROCEDURE — 99024 POSTOP FOLLOW-UP VISIT: CPT | Performed by: PHYSICIAN ASSISTANT

## 2022-10-25 NOTE — PROGRESS NOTES
"Donta Gibson is a 32 y.o. male in for follow up of internal hemorrhoids status post internal hemorrhoidectomy x3 on 10/10/2022. Pathology reviewed and benign.    Lots of irritation and drainage. Improving over time. Yellow and pink discharge but no more blood than that. Taking miralax twice daily with an occasional dose of metamucil. Feels that after surgery his pain level was a 10/10 and now is averaging 5-6/10.    /72 (BP Location: Left arm, Patient Position: Sitting, Cuff Size: Small Adult)   Pulse 70   Temp 98.1 °F (36.7 °C) (Temporal)   Ht 177.8 cm (70\")   Wt 84.6 kg (186 lb 8 oz)   SpO2 98%   BMI 26.76 kg/m²   Body mass index is 26.76 kg/m².  -  Physical Exam  No acute distress  Chaperone present  Perianal exam: Well-healing surgical sites without erythema or induration.  Mild amount of appropriate mucus drainage present.    Assessment:   1. Internal hemorrhoids with complication    -Status post internal hemorrhoidectomy x3 on 10/10/2022    Plan:  • Follow-up in 2 weeks.  If all symptoms have resolved, can cancel appointment.        Kelly Figueroa PA-C  Physician Assistant  Colorectal Surgery      "

## 2023-05-30 ENCOUNTER — TRANSCRIBE ORDERS (OUTPATIENT)
Dept: ADMINISTRATIVE | Facility: HOSPITAL | Age: 33
End: 2023-05-30

## 2023-05-30 DIAGNOSIS — M54.10 RADICULOPATHY, UNSPECIFIED SPINAL REGION: ICD-10-CM

## 2023-05-30 DIAGNOSIS — M54.2 NECK PAIN: Primary | ICD-10-CM

## 2023-08-23 ENCOUNTER — HOSPITAL ENCOUNTER (OUTPATIENT)
Dept: INFUSION THERAPY | Facility: HOSPITAL | Age: 33
Discharge: HOME | End: 2023-08-23
Admitting: PSYCHIATRY & NEUROLOGY
Payer: COMMERCIAL

## 2023-08-23 DIAGNOSIS — M54.2 NECK PAIN: ICD-10-CM

## 2023-08-23 DIAGNOSIS — M54.10 RADICULOPATHY, UNSPECIFIED SPINAL REGION: ICD-10-CM

## 2023-08-23 PROCEDURE — 95886 MUSC TEST DONE W/N TEST COMP: CPT

## 2023-08-23 PROCEDURE — 95909 NRV CNDJ TST 5-6 STUDIES: CPT

## 2023-08-23 PROCEDURE — 95886 MUSC TEST DONE W/N TEST COMP: CPT | Performed by: PSYCHIATRY & NEUROLOGY

## 2023-08-23 PROCEDURE — 95909 NRV CNDJ TST 5-6 STUDIES: CPT | Performed by: PSYCHIATRY & NEUROLOGY

## 2023-08-23 NOTE — PROCEDURES
EMG and Nerve Conduction Studies    I.      Instrument used: Neuromax 1002  II.     Please see data sheets for tabular summary of NCS and details on methods, temperatures and lab standards.   III.    EMG muscles tested for upper extremity studies include the deltoid, biceps, triceps, pronator teres, extensor digitorum communis, first dorsal interosseous and abductor pollicis brevis.    IV.   EMG muscles tested for lower extremity studies include the vastus lateralis, tibialis anterior, peroneus longus, medial gastrocnemius and extensor digitorum brevis.    V.    Additional muscles tested as needed.  Paraspinal muscles tested as needed.   VI.   Please see data sheets for tabular summary of EMG findings.   VII. The complete report includes the data sheets.      Indication: Pain numbness and tingling left neck and arm  History: 33-year-old male with onset around 3 months ago of left-sided neck and shoulder pain radiating down the arm with tingling in left hand and fingers mostly the first 3.  Neck extension triggers the symptoms.  No neck radiology performed at present.      Ht: 177.8 cm  Wt: 84.6 kg  HbA1C: No results found for: HGBA1C  TSH:   Lab Results   Component Value Date    TSH 1.320 06/26/2020       Technical summary:  Nerve conduction studies were obtained in the left arm with 1 comparison on the right.  Skin temperatures were at least 32 øC measured on the palm.  Needle examination was obtained on selected muscles in the left arm    Results:  1.  Normal left median antidromic sensory distal latency and amplitude.  2.  Normal left ulnar antidromic sensory distal latency and amplitude.  3.  Normal left radial sensory distal latency and amplitude.  4.  Normal left median motor conduction velocity with a normal distal latency and amplitudes.  5.  Slow left ulnar motor conduction velocity in the short segment across the elbow at 44.1 m/s with normal velocity below the elbow.  Normal distal latency and amplitudes.   Slow right ulnar motor conduction velocity in the short segment across the elbow at 46.9 m/s with normal velocity below the elbow.  Normal distal latency and amplitudes.  6.  Needle examination of selected muscles in the left arm showed normal insertional activities throughout.  There were normal motor units and recruitment patterns throughout.  Cervical paraspinals at C6/7 showed no abnormality.    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  There was no evidence of a left median neuropathy or left cervical radiculopathy by this study.  Study results were discussed with the patient.    Dannie Cabrera M.D.      Addendum:  Please note that the patient's symptoms do not correlate with ulnar neuropathy at the elbow.  He does not actually have any symptoms that suggest an ulnar neuropathy on either side; however, brief power testing demonstrates mild weakness of the interosseous muscles on both sides with normal abductor pollicis brevis strength.  GNS        Dictated utilizing Dragon dictation.

## 2024-03-25 NOTE — H&P (VIEW-ONLY)
"Subjective   Patient ID: Donta Gibson is a 33 y.o. male is being seen for consultation today at the request of Elmira Fox MD for 2nd opinion neck pain, neuropathy. Patient had a MRI C-spine on 1/23/2024 @ [facility] and EMG/NCS done on 8/23/2023 with Dr. Dannie Cabrera    Treatment: Chiropractor     Today patient states that he has tingling in the L hand, along with neck pain, COSTA's    History of Present Illness    This patient was having pain in his neck with some numbness and tingling radiating down the medial aspect of his left arm.  This all began about a year ago.  Over the course of the last several months the numbness and tingling down his left arm has improved but he still has the neck pain with some occasional pain up and even to his face.  He has no difficulty with bowel bladder control or other associated symptoms.  He has some occasional low back pain.    The following portions of the patient's history were reviewed and updated as appropriate: allergies, current medications, past family history, past medical history, past social history, past surgical history, and problem list.    Review of Systems   Eyes:  Negative for visual disturbance.   Musculoskeletal:  Positive for myalgias, neck pain and neck stiffness.   Neurological:  Positive for headaches. Negative for dizziness and light-headedness.       I reviewed the review of systems listed by the patient and discussed by my MA    Objective     Vitals:    03/28/24 0922   BP: 121/79   Cuff Size: Adult   Pulse: 85   Temp: 97.3 °F (36.3 °C)   SpO2: 98%   Weight: 83.9 kg (185 lb)   Height: 177.8 cm (70\")     Body mass index is 26.54 kg/m².    Tobacco Use: Medium Risk (3/28/2024)    Patient History     Smoking Tobacco Use: Former     Smokeless Tobacco Use: Never     Passive Exposure: Past          Physical Exam  Constitutional:       Appearance: He is well-developed.   HENT:      Head: Normocephalic and atraumatic.   Eyes:      Extraocular Movements: EOM " normal.      Conjunctiva/sclera: Conjunctivae normal.      Pupils: Pupils are equal, round, and reactive to light.   Neck:      Vascular: No carotid bruit.   Neurological:      Mental Status: He is oriented to person, place, and time.      Coordination: Finger-Nose-Finger Test and Heel to Shin Test normal.      Gait: Gait is intact.      Deep Tendon Reflexes:      Reflex Scores:       Tricep reflexes are 2+ on the right side and 2+ on the left side.       Bicep reflexes are 2+ on the right side and 2+ on the left side.       Brachioradialis reflexes are 2+ on the right side and 2+ on the left side.       Patellar reflexes are 2+ on the right side and 2+ on the left side.       Achilles reflexes are 2+ on the right side and 2+ on the left side.  Psychiatric:         Speech: Speech normal.       Neurologic Exam     Mental Status   Oriented to person, place, and time.   Registration of memory: Good recent and remote memory.   Attention: normal. Concentration: normal.   Speech: speech is normal   Level of consciousness: alert  Knowledge: consistent with education.     Cranial Nerves     CN II   Visual fields full to confrontation.   Visual acuity: normal    CN III, IV, VI   Pupils are equal, round, and reactive to light.  Extraocular motions are normal.     CN V   Facial sensation intact.   Right corneal reflex: normal  Left corneal reflex: normal    CN VII   Facial expression full, symmetric.   Right facial weakness: none  Left facial weakness: none    CN VIII   Hearing: intact    CN IX, X   Palate: symmetric    CN XI   Right sternocleidomastoid strength: normal  Left sternocleidomastoid strength: normal    CN XII   Tongue: not atrophic  Tongue deviation: none    Motor Exam   Muscle bulk: normal  Right arm tone: normal  Left arm tone: normal  Right leg tone: normal  Left leg tone: normal    Strength   Strength 5/5 except as noted.     Sensory Exam   Light touch normal.     Gait, Coordination, and Reflexes      Gait  Gait: normal    Coordination   Finger to nose coordination: normal  Heel to shin coordination: normal    Reflexes   Right brachioradialis: 2+  Left brachioradialis: 2+  Right biceps: 2+  Left biceps: 2+  Right triceps: 2+  Left triceps: 2+  Right patellar: 2+  Left patellar: 2+  Right achilles: 2+  Left achilles: 2+  Right : 2+  Left : 2+          Assessment & Plan   Independent Review of Radiographic Studies:      I personally reviewed the images from the following studies.    I reviewed an EMG from August of last year.  This showed bilateral ulnar neuropathy.  There was no evidence of carpal tunnel or cervical radiculopathy.    I then reviewed an MRI of the cervical spine done in January of this year.  This does show a left-sided herniated disc at C3-4.  This appears to be decent size.  The other levels looked okay.    Medical Decision Making:      I told the patient that I thought we should do a cervical myelogram.  I think he actually has 2 problems.  I think the ulnar neuropathy was the main cause of the numbness and tingling in his arm.  I think that the disc at C3-4 may be causing some of his neck pain.  I would think if the disc at C3-4 was causing the trouble in his hand he would have numbness in his entire hand and not just along the medial aspect.  I think the myelogram will give us a better image of what we need to see.  I told the patient what a myelogram involves.  I explained that there is a 50% chance of developing a bad headache and nausea as a result of the test.  I explained that there is also a very small chance of infection, seizures, and bleeding.  I explained how we would treat a post myelogram headache including bedrest, caffeinated fluids, steroids, and blood patch.  The patient does ask to proceed.    Diagnoses and all orders for this visit:    1. Lesion of left ulnar nerve (Primary)    2. Cervical disc disorder with radiculopathy, high cervical region  -     Obtain  Informed Consent; Standing  -     IR Myelogram Cervical Spine; Future  -     CT Cervical Spine With Intrathecal Contrast; Future  -     XR Spine Cervical Complete With Flex Ext; Future  -     No Lab Testing Needed; Standing  -     dexAMETHasone (DECADRON) 4 MG tablet; Take 2 tablets by mouth Take As Directed. Take both tablets by mouth 2 hours before myelogram  Dispense: 2 tablet; Refill: 0      Return for After radiology test.

## 2024-03-25 NOTE — PROGRESS NOTES
"Subjective   Patient ID: Donta Gibson is a 33 y.o. male is being seen for consultation today at the request of Elmira Fox MD for 2nd opinion neck pain, neuropathy. Patient had a MRI C-spine on 1/23/2024 @ [facility] and EMG/NCS done on 8/23/2023 with Dr. Dannie Cabrera    Treatment: Chiropractor     Today patient states that he has tingling in the L hand, along with neck pain, COSTA's    History of Present Illness    This patient was having pain in his neck with some numbness and tingling radiating down the medial aspect of his left arm.  This all began about a year ago.  Over the course of the last several months the numbness and tingling down his left arm has improved but he still has the neck pain with some occasional pain up and even to his face.  He has no difficulty with bowel bladder control or other associated symptoms.  He has some occasional low back pain.    The following portions of the patient's history were reviewed and updated as appropriate: allergies, current medications, past family history, past medical history, past social history, past surgical history, and problem list.    Review of Systems   Eyes:  Negative for visual disturbance.   Musculoskeletal:  Positive for myalgias, neck pain and neck stiffness.   Neurological:  Positive for headaches. Negative for dizziness and light-headedness.       I reviewed the review of systems listed by the patient and discussed by my MA    Objective     Vitals:    03/28/24 0922   BP: 121/79   Cuff Size: Adult   Pulse: 85   Temp: 97.3 °F (36.3 °C)   SpO2: 98%   Weight: 83.9 kg (185 lb)   Height: 177.8 cm (70\")     Body mass index is 26.54 kg/m².    Tobacco Use: Medium Risk (3/28/2024)    Patient History     Smoking Tobacco Use: Former     Smokeless Tobacco Use: Never     Passive Exposure: Past          Physical Exam  Constitutional:       Appearance: He is well-developed.   HENT:      Head: Normocephalic and atraumatic.   Eyes:      Extraocular Movements: EOM " normal.      Conjunctiva/sclera: Conjunctivae normal.      Pupils: Pupils are equal, round, and reactive to light.   Neck:      Vascular: No carotid bruit.   Neurological:      Mental Status: He is oriented to person, place, and time.      Coordination: Finger-Nose-Finger Test and Heel to Shin Test normal.      Gait: Gait is intact.      Deep Tendon Reflexes:      Reflex Scores:       Tricep reflexes are 2+ on the right side and 2+ on the left side.       Bicep reflexes are 2+ on the right side and 2+ on the left side.       Brachioradialis reflexes are 2+ on the right side and 2+ on the left side.       Patellar reflexes are 2+ on the right side and 2+ on the left side.       Achilles reflexes are 2+ on the right side and 2+ on the left side.  Psychiatric:         Speech: Speech normal.       Neurologic Exam     Mental Status   Oriented to person, place, and time.   Registration of memory: Good recent and remote memory.   Attention: normal. Concentration: normal.   Speech: speech is normal   Level of consciousness: alert  Knowledge: consistent with education.     Cranial Nerves     CN II   Visual fields full to confrontation.   Visual acuity: normal    CN III, IV, VI   Pupils are equal, round, and reactive to light.  Extraocular motions are normal.     CN V   Facial sensation intact.   Right corneal reflex: normal  Left corneal reflex: normal    CN VII   Facial expression full, symmetric.   Right facial weakness: none  Left facial weakness: none    CN VIII   Hearing: intact    CN IX, X   Palate: symmetric    CN XI   Right sternocleidomastoid strength: normal  Left sternocleidomastoid strength: normal    CN XII   Tongue: not atrophic  Tongue deviation: none    Motor Exam   Muscle bulk: normal  Right arm tone: normal  Left arm tone: normal  Right leg tone: normal  Left leg tone: normal    Strength   Strength 5/5 except as noted.     Sensory Exam   Light touch normal.     Gait, Coordination, and Reflexes      Gait  Gait: normal    Coordination   Finger to nose coordination: normal  Heel to shin coordination: normal    Reflexes   Right brachioradialis: 2+  Left brachioradialis: 2+  Right biceps: 2+  Left biceps: 2+  Right triceps: 2+  Left triceps: 2+  Right patellar: 2+  Left patellar: 2+  Right achilles: 2+  Left achilles: 2+  Right : 2+  Left : 2+          Assessment & Plan   Independent Review of Radiographic Studies:      I personally reviewed the images from the following studies.    I reviewed an EMG from August of last year.  This showed bilateral ulnar neuropathy.  There was no evidence of carpal tunnel or cervical radiculopathy.    I then reviewed an MRI of the cervical spine done in January of this year.  This does show a left-sided herniated disc at C3-4.  This appears to be decent size.  The other levels looked okay.    Medical Decision Making:      I told the patient that I thought we should do a cervical myelogram.  I think he actually has 2 problems.  I think the ulnar neuropathy was the main cause of the numbness and tingling in his arm.  I think that the disc at C3-4 may be causing some of his neck pain.  I would think if the disc at C3-4 was causing the trouble in his hand he would have numbness in his entire hand and not just along the medial aspect.  I think the myelogram will give us a better image of what we need to see.  I told the patient what a myelogram involves.  I explained that there is a 50% chance of developing a bad headache and nausea as a result of the test.  I explained that there is also a very small chance of infection, seizures, and bleeding.  I explained how we would treat a post myelogram headache including bedrest, caffeinated fluids, steroids, and blood patch.  The patient does ask to proceed.    Diagnoses and all orders for this visit:    1. Lesion of left ulnar nerve (Primary)    2. Cervical disc disorder with radiculopathy, high cervical region  -     Obtain  Informed Consent; Standing  -     IR Myelogram Cervical Spine; Future  -     CT Cervical Spine With Intrathecal Contrast; Future  -     XR Spine Cervical Complete With Flex Ext; Future  -     No Lab Testing Needed; Standing  -     dexAMETHasone (DECADRON) 4 MG tablet; Take 2 tablets by mouth Take As Directed. Take both tablets by mouth 2 hours before myelogram  Dispense: 2 tablet; Refill: 0      Return for After radiology test.

## 2024-03-28 ENCOUNTER — OFFICE VISIT (OUTPATIENT)
Dept: NEUROSURGERY | Facility: CLINIC | Age: 34
End: 2024-03-28
Payer: COMMERCIAL

## 2024-03-28 VITALS
HEART RATE: 85 BPM | BODY MASS INDEX: 26.48 KG/M2 | DIASTOLIC BLOOD PRESSURE: 79 MMHG | OXYGEN SATURATION: 98 % | SYSTOLIC BLOOD PRESSURE: 121 MMHG | HEIGHT: 70 IN | TEMPERATURE: 97.3 F | WEIGHT: 185 LBS

## 2024-03-28 DIAGNOSIS — M50.11 CERVICAL DISC DISORDER WITH RADICULOPATHY, HIGH CERVICAL REGION: ICD-10-CM

## 2024-03-28 DIAGNOSIS — G56.22 LESION OF LEFT ULNAR NERVE: Primary | ICD-10-CM

## 2024-03-28 PROCEDURE — 99204 OFFICE O/P NEW MOD 45 MIN: CPT | Performed by: NEUROLOGICAL SURGERY

## 2024-03-28 RX ORDER — DEXAMETHASONE 4 MG/1
8 TABLET ORAL TAKE AS DIRECTED
Qty: 2 TABLET | Refills: 0 | Status: SHIPPED | OUTPATIENT
Start: 2024-03-28

## 2024-03-28 NOTE — PROGRESS NOTES
04/04/24 0002   Pre-Procedure Phone Call   Procedure Time Verified Yes   Arrival Time 0600   Procedure Location Verified Yes   Medical History Reviewed No   NPO Status Reinforced Yes   Ride and Caregiver Arranged Yes   Patient Knows to Bring Current Medications   (No changes in medications since last reviewed. Decadron RX'd)   Bring Outside Films Requested   (Dr Martin patient)

## 2024-04-04 ENCOUNTER — HOSPITAL ENCOUNTER (OUTPATIENT)
Dept: GENERAL RADIOLOGY | Facility: HOSPITAL | Age: 34
Discharge: HOME | End: 2024-04-04
Payer: COMMERCIAL

## 2024-04-04 ENCOUNTER — HOSPITAL ENCOUNTER (OUTPATIENT)
Dept: CT IMAGING | Facility: HOSPITAL | Age: 34
Discharge: HOME | End: 2024-04-04
Payer: COMMERCIAL

## 2024-04-04 VITALS
SYSTOLIC BLOOD PRESSURE: 115 MMHG | BODY MASS INDEX: 26.48 KG/M2 | HEART RATE: 66 BPM | DIASTOLIC BLOOD PRESSURE: 65 MMHG | RESPIRATION RATE: 18 BRPM | WEIGHT: 185 LBS | OXYGEN SATURATION: 98 % | HEIGHT: 70 IN | TEMPERATURE: 98 F

## 2024-04-04 DIAGNOSIS — M50.11 CERVICAL DISC DISORDER WITH RADICULOPATHY, HIGH CERVICAL REGION: ICD-10-CM

## 2024-04-04 PROCEDURE — 77003 FLUOROGUIDE FOR SPINE INJECT: CPT

## 2024-04-04 PROCEDURE — 62284 INJECTION FOR MYELOGRAM: CPT

## 2024-04-04 PROCEDURE — 72052 X-RAY EXAM NECK SPINE 6/>VWS: CPT

## 2024-04-04 PROCEDURE — 62302 MYELOGRAPHY LUMBAR INJECTION: CPT

## 2024-04-04 PROCEDURE — 72240 MYELOGRAPHY NECK SPINE: CPT

## 2024-04-04 PROCEDURE — 25510000001 IOPAMIDOL 61 % SOLUTION: Performed by: NEUROLOGICAL SURGERY

## 2024-04-04 PROCEDURE — 25010000002 LIDOCAINE 1 % SOLUTION: Performed by: NEUROLOGICAL SURGERY

## 2024-04-04 PROCEDURE — 72126 CT NECK SPINE W/DYE: CPT

## 2024-04-04 PROCEDURE — 62284 INJECTION FOR MYELOGRAM: CPT | Performed by: NEUROLOGICAL SURGERY

## 2024-04-04 RX ORDER — LIDOCAINE HYDROCHLORIDE 10 MG/ML
10 INJECTION, SOLUTION INFILTRATION; PERINEURAL ONCE
Status: COMPLETED | OUTPATIENT
Start: 2024-04-04 | End: 2024-04-04

## 2024-04-04 RX ORDER — NICOTINE 21 MG/24HR
1 PATCH, TRANSDERMAL 24 HOURS TRANSDERMAL EVERY 24 HOURS
COMMUNITY

## 2024-04-04 RX ORDER — IOPAMIDOL 612 MG/ML
15 INJECTION, SOLUTION INTRATHECAL
Status: COMPLETED | OUTPATIENT
Start: 2024-04-04 | End: 2024-04-04

## 2024-04-04 RX ORDER — ACETAMINOPHEN 325 MG/1
650 TABLET ORAL EVERY 4 HOURS PRN
Status: DISCONTINUED | OUTPATIENT
Start: 2024-04-04 | End: 2024-04-05 | Stop reason: HOSPADM

## 2024-04-04 RX ORDER — HYDROCODONE BITARTRATE AND ACETAMINOPHEN 5; 325 MG/1; MG/1
1 TABLET ORAL EVERY 4 HOURS PRN
Status: DISCONTINUED | OUTPATIENT
Start: 2024-04-04 | End: 2024-04-05 | Stop reason: HOSPADM

## 2024-04-04 RX ADMIN — IOPAMIDOL 15 ML: 612 INJECTION, SOLUTION INTRATHECAL at 07:06

## 2024-04-04 RX ADMIN — HYDROCODONE BITARTRATE AND ACETAMINOPHEN 1 TABLET: 5; 325 TABLET ORAL at 08:34

## 2024-04-04 RX ADMIN — LIDOCAINE HYDROCHLORIDE 3 ML: 10 INJECTION, SOLUTION INFILTRATION; PERINEURAL at 07:05

## 2024-04-04 NOTE — POST-PROCEDURE NOTE
Preop diagnosis:  Cervical radiculopathy and Lumbar radiculopathy  Postop diagnosis:  same  LP at L23  10 cc of 300M Isovue  Complications: none  EBL: 0 cc  Specimens: none

## 2024-04-04 NOTE — DISCHARGE INSTRUCTIONS
EDUCATION /DISCHARGE INSTRUCTIONS:    A myelogram is a special radiology procedure of the spinal cord, spinal nerves and other related structures.  You will be awake during the examination.  An area of your lower back will be cleansed with an antiseptic solution.  The physician will inject a numbing medication in your lower back.  While your back is numb, a needle will be placed in the lower back area.  A small amount of spinal fluid may be withdrawn and sent to the lab if ordered by your physician. While the needle is in the back, an injection of a contrast material (xray dye) will be given through the needle.  The contrast material will allow the physician to see the spinal cord and spinal nerves.  Once injected, the needle will be removed and a band aid will be placed over the injection site.  The table will be tilted during the process to allow the contrast material to flow to particular areas in the spine.  Following the injection and xrays, you will be taken to the CT scanner where more pictures will be taken. After the procedure is finished, the contrast material will be absorbed by your body and eliminated through your kidneys.  The radiologist will study and interpret your myelogram and send the results to your physician.  Procedure risks of a myelogram include, but are not limited to:  *  Bleeding   *  Seizure  *  Infection   *  Headache, possibly severe requiring a blood patch  *  Contrast reaction  *  Nerve or cord injury  *  Paralysis and death  Benefits of the procedure:  *  Best examination for delineating pathology related to spinal cord compression from a disc and/or nerve root compression  Alternatives to the procedure:  MRI - a non invasive procedure requiring intravenous contrast injection.  Cannot be done on patients with certain pacemakers or metal in the body.  MRI risks include possible reaction to the contrast material, movement of metal located in the body.Benefit to MRI:  Non-invasive and  usually painless procedure.  THIS EDUCATION INFORMATION WAS REVIEWED PRIOR TO PROCEDURE AND CONSENT.   Patient initials_______BMF_________Time________6:24am__________    24 hour rest period ends ________Friday, April 5, 2024 ____________.  Important information following your myelogram:  * ACTIVITY:   *  You may sit up in the car to go home.  *  When you get home, remain on bed rest (flat on your back or on your side) for 24 hours. You may place a rolled up towel under your neck for support  * You may get up to the bathroom and sit up to eat and drink then lie back down  * Drink additional fluids for 24 hours after the myelogram.   * Continue to drink additional fluids for the next 2-3 days. Water and caffeinated beverages are encouraged.  * Remain less active for the next two to three days.  * Do not drive for 24 hours following a myelogram.  * You may remove the bandage and shower in the morning.    CALL YOUR PHYSICIAN FOR THE FOLLOWING:  * Pain at the injection site  * Redness, swelling, bruising or drainage at the injection site.  * A fever by mouth of 101.0 or any new symptoms  Headaches are a common side effect after a myelogram.  If you get a headache, you should stay flat in bed and drink plenty of fluids. If the headache persists and does not go away with rest/medication,   CALL Dr. Martin at [phone]

## 2024-04-10 NOTE — PROGRESS NOTES
Subjective   Patient ID: Donta Gibson is a 33 y.o. male is here today for follow-up with a new Cervical Myelogram done on 4/4/2024.    Today patient's symptoms are unchanged     History of Present Illness    This patient returns today.  He continues with pain in his neck with radiation down his left arm primarily.  He has numbness and tingling as well.  He still gets a lot of pain in his back whenever he bends over a counter or keeps his head in an awkward position.    The following portions of the patient's history were reviewed and updated as appropriate: allergies, current medications, past family history, past medical history, past social history, past surgical history, and problem list.    Review of Systems   Constitutional:  Negative for chills and fever.   HENT:  Negative for congestion.    Musculoskeletal:  Positive for neck pain. Negative for myalgias and neck stiffness.   Neurological:  Positive for weakness and numbness. Negative for headaches.       I reviewed the review of systems listed by the patient and discussed by my MA    Objective     There were no vitals filed for this visit.  There is no height or weight on file to calculate BMI.    Tobacco Use: Medium Risk (4/11/2024)    Patient History     Smoking Tobacco Use: Former     Smokeless Tobacco Use: Never     Passive Exposure: Past          Physical Exam  Neurological:      Mental Status: He is alert and oriented to person, place, and time.       Neurologic Exam     Mental Status   Oriented to person, place, and time.           Assessment & Plan   Independent Review of Radiographic Studies:      I personally reviewed the images from the following studies.    I reviewed his plain films, myelogram, and CT scan myself.  The plain films really do not show very much degenerative disease consistent with his age.  There is no evidence of abnormal movement on flexion and extension.  On the myelogram itself there is pretty good nerve root filling  throughout the lumbar spine.  There may be just a little blunting of the nerve at C5-6 on the left.  On the lateral films there is a little posterior bulging at that level.  On the post myelographic CT scan there is some posterior bulging at C3-4 which we can see on the sagittal images but the other levels look okay.  On the axial images C2-3 is widely open.  C3-4 does show a central disc herniation that is distorting the spinal cord to a fairly significant degree.  It only leaves about a 7 mm canal.  C4-5, C5-6 and C6-7 all look okay.  C7-T1 looks okay as well.    Medical Decision Making:      I told the patient about the imaging.  I told him that from my point of view there is enough pressure on the spinal cord I would tend to consider surgery.  I told the patient about the surgery which is called an anterior cervical discectomy.  I explained that there is an 80% chance of getting rid of the arm pain and any other arm symptoms.  I also explained that the patient would still have neck pain.  Initially this will be quite severe however it will improve with healing from the surgery.  There is also a risk of infection, bleeding, paralysis, and anesthetic risk.  There is a risk of damage to the soft tissues of the neck such as the esophagus, carotids, and trachea.  All of these risks are about 2 or 3%.  There is about a 5% chance of nonunion or failure of the instrumentation.  There is also a about a 5% chance of hoarseness and trouble swallowing do to damage to the recurrent laryngeal nerve.  We discussed the postoperative hospital and home course as well.  The patient does ask to think about it and will call if he wishes to proceed.    If he calls he will need to be scheduled for a: Cervical 3 to cervical 4 anterior cervical discectomy with disc arthroplasty    Diagnoses and all orders for this visit:    1. Cervical disc disorder with radiculopathy, high cervical region (Primary)      Return for 2-3 week post  op.

## 2024-04-11 ENCOUNTER — OFFICE VISIT (OUTPATIENT)
Dept: NEUROSURGERY | Facility: CLINIC | Age: 34
End: 2024-04-11
Payer: COMMERCIAL

## 2024-04-11 DIAGNOSIS — M50.11 CERVICAL DISC DISORDER WITH RADICULOPATHY, HIGH CERVICAL REGION: Primary | ICD-10-CM

## 2024-04-11 PROCEDURE — 99214 OFFICE O/P EST MOD 30 MIN: CPT | Performed by: NEUROLOGICAL SURGERY

## 2025-04-28 ENCOUNTER — HOSPITAL ENCOUNTER (OUTPATIENT)
Facility: HOSPITAL | Age: 35
Discharge: HOME | End: 2025-04-28
Attending: STUDENT IN AN ORGANIZED HEALTH CARE EDUCATION/TRAINING PROGRAM
Payer: COMMERCIAL

## 2025-04-28 VITALS
OXYGEN SATURATION: 98 % | WEIGHT: 178 LBS | HEART RATE: 72 BPM | RESPIRATION RATE: 15 BRPM | BODY MASS INDEX: 25.48 KG/M2 | TEMPERATURE: 97.9 F | SYSTOLIC BLOOD PRESSURE: 145 MMHG | HEIGHT: 70 IN | DIASTOLIC BLOOD PRESSURE: 99 MMHG

## 2025-04-28 DIAGNOSIS — M54.6 ACUTE MIDLINE THORACIC BACK PAIN: Primary | ICD-10-CM

## 2025-04-28 PROCEDURE — 25010000002 KETOROLAC TROMETHAMINE PER 15 MG: Performed by: STUDENT IN AN ORGANIZED HEALTH CARE EDUCATION/TRAINING PROGRAM

## 2025-04-28 PROCEDURE — G0463 HOSPITAL OUTPT CLINIC VISIT: HCPCS | Performed by: STUDENT IN AN ORGANIZED HEALTH CARE EDUCATION/TRAINING PROGRAM

## 2025-04-28 PROCEDURE — 25010000002 METHYLPREDNISOLONE PER 125 MG: Performed by: STUDENT IN AN ORGANIZED HEALTH CARE EDUCATION/TRAINING PROGRAM

## 2025-04-28 RX ORDER — METHYLPREDNISOLONE 4 MG/1
TABLET ORAL
Qty: 21 TABLET | Refills: 0 | Status: SHIPPED | OUTPATIENT
Start: 2025-04-28

## 2025-04-28 RX ORDER — METHYLPREDNISOLONE SODIUM SUCCINATE 125 MG/2ML
80 INJECTION, POWDER, LYOPHILIZED, FOR SOLUTION INTRAMUSCULAR; INTRAVENOUS ONCE
Status: COMPLETED | OUTPATIENT
Start: 2025-04-28 | End: 2025-04-28

## 2025-04-28 RX ORDER — CYCLOBENZAPRINE HCL 10 MG
10 TABLET ORAL 3 TIMES DAILY PRN
Qty: 15 TABLET | Refills: 0 | Status: SHIPPED | OUTPATIENT
Start: 2025-04-28 | End: 2025-05-03

## 2025-04-28 RX ORDER — KETOROLAC TROMETHAMINE 30 MG/ML
30 INJECTION, SOLUTION INTRAMUSCULAR; INTRAVENOUS ONCE
Status: COMPLETED | OUTPATIENT
Start: 2025-04-28 | End: 2025-04-28

## 2025-04-28 RX ADMIN — METHYLPREDNISOLONE SODIUM SUCCINATE 80 MG: 125 INJECTION, POWDER, FOR SOLUTION INTRAMUSCULAR; INTRAVENOUS at 10:24

## 2025-04-28 RX ADMIN — KETOROLAC TROMETHAMINE 30 MG: 30 INJECTION, SOLUTION INTRAMUSCULAR; INTRAVENOUS at 10:24
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
## 2025-04-28 NOTE — Clinical Note
[facility]  [address]  [place] [address]    Donta Gibson was seen and treated in our emergency department on 4/28/2025.  He may return to work on 05/01/2025.         Thank you for choosing [facility].    Ameya Serrano MD

## 2025-04-28 NOTE — Clinical Note
[facility]  [address]  [place] [address]    Donta Gibson was seen and treated in our emergency department on 4/28/2025.  He may return to work on 05/01/2025.         Thank you for choosing [facility].    Ameya Serrano MD

## (undated) DEVICE — 3M™ STERI-STRIP™ COMPOUND BENZOIN TINCTURE 40 BAGS/CARTON 4 CARTONS/CASE C1544: Brand: 3M™ STERI-STRIP™

## (undated) DEVICE — CANN O2 ETCO2 FITS ALL CONN CO2 SMPL A/ 7IN DISP LF

## (undated) DEVICE — ANTIBACTERIAL UNDYED BRAIDED (POLYGLACTIN 910), SYNTHETIC ABSORBABLE SUTURE: Brand: COATED VICRYL

## (undated) DEVICE — SPNG LAP 18X18IN LF STRL PK/5

## (undated) DEVICE — SINGLE-USE BIOPSY FORCEPS: Brand: RADIAL JAW 4

## (undated) DEVICE — TRAP FLD MINIVAC MEGADYNE 100ML

## (undated) DEVICE — ADAPT CLN BIOGUARD AIR/H2O DISP

## (undated) DEVICE — PREP SOL POVIDONE/IODINE BT 4OZ

## (undated) DEVICE — PANTY KNIT WASHABLE LG/XL BRN/GRN LF

## (undated) DEVICE — ELECTRD BLD EZ CLN MOD XLNG 2.75IN

## (undated) DEVICE — TUBING, SUCTION, 1/4" X 10', STRAIGHT: Brand: MEDLINE

## (undated) DEVICE — NDL HYPO ECLPS SFTY 22G 1 1/2IN

## (undated) DEVICE — LOU MINOR PROCEDURE: Brand: MEDLINE INDUSTRIES, INC.

## (undated) DEVICE — DRSNG SURESITE WNDW 4X4.5

## (undated) DEVICE — SENSR O2 OXIMAX FNGR A/ 18IN NONSTR

## (undated) DEVICE — GLV SURG SENSICARE PI LF PF 7.5 GRN STRL

## (undated) DEVICE — LN SMPL CO2 SHTRM SD STREAM W/M LUER

## (undated) DEVICE — GOWN SURG AERO CHROME XL

## (undated) DEVICE — PENCL ES MEGADINE EZ/CLEAN BUTN W/HOLSTR 10FT

## (undated) DEVICE — SKIN PREP TRAY W/CHG: Brand: MEDLINE INDUSTRIES, INC.

## (undated) DEVICE — SMOKE EVACUATION TUBING WITH 8 IN INTEGRAL WAND AND SPONGE GUARD: Brand: BUFFALO FILTER

## (undated) DEVICE — PENCL E/S ULTRAVAC TELESCP NOSE HOLSTR 10FT

## (undated) DEVICE — STERILE LATEX POWDER-FREE SURGICAL GLOVESWITH NITRILE COATING: Brand: PROTEXIS

## (undated) DEVICE — GLV SURG PREMIERPRO ORTHO LTX PF SZ8 BRN

## (undated) DEVICE — DRAPE,UTILITY,TAPE,15X26,STERILE: Brand: MEDLINE

## (undated) DEVICE — SPNG GZ WOVN 4X4IN 12PLY 10/BX STRL

## (undated) DEVICE — KT ORCA ORCAPOD DISP STRL

## (undated) DEVICE — 3M™ STERI-STRIP™ REINFORCED ADHESIVE SKIN CLOSURES, R1547, 1/2 IN X 4 IN (12 MM X 100 MM), 6 STRIPS/ENVELOPE: Brand: 3M™ STERI-STRIP™

## (undated) DEVICE — GOWN,SIRUS,NON REINFRCD,LARGE,SET IN SL: Brand: MEDLINE

## (undated) DEVICE — PATIENT RETURN ELECTRODE, SINGLE-USE, CONTACT QUALITY MONITORING, ADULT, WITH 9FT CORD, FOR PATIENTS WEIGING OVER 33LBS. (15KG): Brand: MEGADYNE